# Patient Record
Sex: FEMALE | Race: BLACK OR AFRICAN AMERICAN | NOT HISPANIC OR LATINO | Employment: FULL TIME | URBAN - NONMETROPOLITAN AREA
[De-identification: names, ages, dates, MRNs, and addresses within clinical notes are randomized per-mention and may not be internally consistent; named-entity substitution may affect disease eponyms.]

---

## 2017-01-01 ENCOUNTER — HOSPITAL ENCOUNTER (OUTPATIENT)
Dept: GENERAL RADIOLOGY | Facility: HOSPITAL | Age: 47
Discharge: HOME | End: 2017-12-04
Admitting: NURSE PRACTITIONER

## 2017-01-01 ENCOUNTER — TRANSCRIBE ORDERS (OUTPATIENT)
Dept: GENERAL RADIOLOGY | Facility: HOSPITAL | Age: 47
End: 2017-01-01

## 2017-01-01 DIAGNOSIS — R05.9 COUGH: Primary | ICD-10-CM

## 2017-01-01 DIAGNOSIS — R06.00 DYSPNEA, UNSPECIFIED TYPE: ICD-10-CM

## 2017-01-01 PROCEDURE — 71020 HC CHEST PA AND LATERAL: CPT

## 2018-01-01 ENCOUNTER — APPOINTMENT (OUTPATIENT)
Dept: GENERAL RADIOLOGY | Facility: HOSPITAL | Age: 48
End: 2018-01-01

## 2018-01-01 ENCOUNTER — ANESTHESIA (OUTPATIENT)
Dept: CARDIOVASCULAR ICU | Facility: HOSPITAL | Age: 48
End: 2018-01-01

## 2018-01-01 ENCOUNTER — APPOINTMENT (OUTPATIENT)
Dept: CARDIOLOGY | Facility: HOSPITAL | Age: 48
End: 2018-01-01
Attending: FAMILY MEDICINE

## 2018-01-01 ENCOUNTER — APPOINTMENT (OUTPATIENT)
Dept: CT IMAGING | Facility: HOSPITAL | Age: 48
End: 2018-01-01

## 2018-01-01 ENCOUNTER — ANESTHESIA EVENT (OUTPATIENT)
Dept: CARDIOVASCULAR ICU | Facility: HOSPITAL | Age: 48
End: 2018-01-01

## 2018-01-01 ENCOUNTER — HOSPITAL ENCOUNTER (INPATIENT)
Facility: HOSPITAL | Age: 48
LOS: 2 days | End: 2018-03-11
Attending: EMERGENCY MEDICINE | Admitting: FAMILY MEDICINE

## 2018-01-01 DIAGNOSIS — R50.9 FEVER IN ADULT: ICD-10-CM

## 2018-01-01 DIAGNOSIS — I50.23 ACUTE ON CHRONIC SYSTOLIC CONGESTIVE HEART FAILURE (HCC): ICD-10-CM

## 2018-01-01 DIAGNOSIS — I46.9 CARDIAC ARREST (HCC): Primary | ICD-10-CM

## 2018-01-01 LAB
ABO GROUP BLD: NORMAL
ALBUMIN SERPL-MCNC: 2.9 G/DL (ref 3.5–5)
ALBUMIN SERPL-MCNC: 3.2 G/DL (ref 3.5–5)
ALBUMIN SERPL-MCNC: 3.2 G/DL (ref 3.5–5)
ALBUMIN SERPL-MCNC: 3.4 G/DL (ref 3.5–5)
ALBUMIN SERPL-MCNC: 3.4 G/DL (ref 3.5–5)
ALBUMIN SERPL-MCNC: 3.5 G/DL (ref 3.5–5)
ALBUMIN SERPL-MCNC: 4 G/DL (ref 3.5–5)
ALBUMIN/GLOB SERPL: 1 G/DL (ref 1.1–2.5)
ALBUMIN/GLOB SERPL: 1 G/DL (ref 1.1–2.5)
ALBUMIN/GLOB SERPL: 1.1 G/DL (ref 1.1–2.5)
ALP SERPL-CCNC: 56 U/L (ref 24–120)
ALP SERPL-CCNC: 61 U/L (ref 24–120)
ALP SERPL-CCNC: 63 U/L (ref 24–120)
ALP SERPL-CCNC: 66 U/L (ref 24–120)
ALP SERPL-CCNC: 70 U/L (ref 24–120)
ALP SERPL-CCNC: 72 U/L (ref 24–120)
ALP SERPL-CCNC: 72 U/L (ref 24–120)
ALP SERPL-CCNC: 73 U/L (ref 24–120)
ALP SERPL-CCNC: 84 U/L (ref 24–120)
ALT SERPL W P-5'-P-CCNC: 164 U/L (ref 0–54)
ALT SERPL W P-5'-P-CCNC: 337 U/L (ref 0–54)
ALT SERPL W P-5'-P-CCNC: 454 U/L (ref 0–54)
ALT SERPL W P-5'-P-CCNC: 50 U/L (ref 0–54)
ALT SERPL W P-5'-P-CCNC: 500 U/L (ref 0–54)
ALT SERPL W P-5'-P-CCNC: 52 U/L (ref 0–54)
ALT SERPL W P-5'-P-CCNC: 74 U/L (ref 0–54)
ALT SERPL W P-5'-P-CCNC: 86 U/L (ref 0–54)
ALT SERPL W P-5'-P-CCNC: 88 U/L (ref 0–54)
AMPHET+METHAMPHET UR QL: NEGATIVE
ANION GAP SERPL CALCULATED.3IONS-SCNC: 16 MMOL/L (ref 4–13)
ANION GAP SERPL CALCULATED.3IONS-SCNC: 23 MMOL/L (ref 4–13)
ANION GAP SERPL CALCULATED.3IONS-SCNC: 23 MMOL/L (ref 4–13)
ANION GAP SERPL CALCULATED.3IONS-SCNC: 26 MMOL/L (ref 4–13)
ANION GAP SERPL CALCULATED.3IONS-SCNC: 27 MMOL/L (ref 4–13)
APTT PPP: 25.4 SECONDS (ref 24.1–34.8)
APTT PPP: 26.3 SECONDS (ref 24.1–34.8)
APTT PPP: 29.2 SECONDS (ref 24.1–34.8)
APTT PPP: 30.3 SECONDS (ref 24.1–34.8)
APTT PPP: 30.8 SECONDS (ref 24.1–34.8)
APTT PPP: 33.1 SECONDS (ref 24.1–34.8)
APTT PPP: 33.5 SECONDS (ref 24.1–34.8)
APTT PPP: 34.1 SECONDS (ref 24.1–34.8)
APTT PPP: 34.3 SECONDS (ref 24.1–34.8)
ARTERIAL PATENCY WRIST A: ABNORMAL
ARTERIAL PATENCY WRIST A: POSITIVE
ARTERIAL PATENCY WRIST A: POSITIVE
AST SERPL-CCNC: 128 U/L (ref 7–45)
AST SERPL-CCNC: 166 U/L (ref 7–45)
AST SERPL-CCNC: 271 U/L (ref 7–45)
AST SERPL-CCNC: 51 U/L (ref 7–45)
AST SERPL-CCNC: 53 U/L (ref 7–45)
AST SERPL-CCNC: 533 U/L (ref 7–45)
AST SERPL-CCNC: 722 U/L (ref 7–45)
AST SERPL-CCNC: 733 U/L (ref 7–45)
AST SERPL-CCNC: 96 U/L (ref 7–45)
ATMOSPHERIC PRESS: 747 MMHG
ATMOSPHERIC PRESS: 748 MMHG
ATMOSPHERIC PRESS: 749 MMHG
B-HCG UR QL: NEGATIVE
BACTERIA SPEC AEROBE CULT: NORMAL
BACTERIA UR QL AUTO: ABNORMAL /HPF
BARBITURATES UR QL SCN: NEGATIVE
BASE EXCESS BLDA CALC-SCNC: -11.6 MMOL/L (ref 0–2)
BASE EXCESS BLDA CALC-SCNC: -11.9 MMOL/L (ref 0–2)
BASE EXCESS BLDA CALC-SCNC: -14.1 MMOL/L (ref 0–2)
BASE EXCESS BLDA CALC-SCNC: -15.2 MMOL/L (ref 0–2)
BASE EXCESS BLDA CALC-SCNC: -15.7 MMOL/L (ref 0–2)
BASE EXCESS BLDA CALC-SCNC: -17.2 MMOL/L (ref 0–2)
BASE EXCESS BLDA CALC-SCNC: -2.5 MMOL/L (ref 0–2)
BASE EXCESS BLDA CALC-SCNC: -3.7 MMOL/L (ref 0–2)
BASE EXCESS BLDA CALC-SCNC: -4.9 MMOL/L (ref 0–2)
BASE EXCESS BLDA CALC-SCNC: -8.7 MMOL/L (ref 0–2)
BASOPHILS # BLD AUTO: 0.03 10*3/MM3 (ref 0–0.2)
BASOPHILS # BLD AUTO: 0.03 10*3/MM3 (ref 0–0.2)
BASOPHILS # BLD AUTO: 0.04 10*3/MM3 (ref 0–0.2)
BASOPHILS # BLD AUTO: 0.04 10*3/MM3 (ref 0–0.2)
BASOPHILS # BLD AUTO: 0.05 10*3/MM3 (ref 0–0.2)
BASOPHILS # BLD AUTO: 0.05 10*3/MM3 (ref 0–0.2)
BASOPHILS # BLD AUTO: 0.06 10*3/MM3 (ref 0–0.2)
BASOPHILS # BLD AUTO: 0.06 10*3/MM3 (ref 0–0.2)
BASOPHILS # BLD AUTO: 0.07 10*3/MM3 (ref 0–0.2)
BASOPHILS # BLD AUTO: 0.07 10*3/MM3 (ref 0–0.2)
BASOPHILS NFR BLD AUTO: 0.1 % (ref 0–2)
BASOPHILS NFR BLD AUTO: 0.2 % (ref 0–2)
BASOPHILS NFR BLD AUTO: 0.2 % (ref 0–2)
BASOPHILS NFR BLD AUTO: 0.3 % (ref 0–2)
BASOPHILS NFR BLD AUTO: 0.4 % (ref 0–2)
BASOPHILS NFR BLD AUTO: 0.4 % (ref 0–2)
BDY SITE: ABNORMAL
BENZODIAZ UR QL SCN: NEGATIVE
BH CV ECHO MEAS - AO MAX PG (FULL): 1.4 MMHG
BH CV ECHO MEAS - AO MAX PG: 1.7 MMHG
BH CV ECHO MEAS - AO MEAN PG (FULL): 1 MMHG
BH CV ECHO MEAS - AO MEAN PG: 1 MMHG
BH CV ECHO MEAS - AO ROOT AREA (BSA CORRECTED): 1.3
BH CV ECHO MEAS - AO ROOT AREA: 7.5 CM^2
BH CV ECHO MEAS - AO ROOT DIAM: 3.1 CM
BH CV ECHO MEAS - AO V2 MAX: 64.7 CM/SEC
BH CV ECHO MEAS - AO V2 MEAN: 49.4 CM/SEC
BH CV ECHO MEAS - AO V2 VTI: 9.2 CM
BH CV ECHO MEAS - AVA(I,A): 1.2 CM^2
BH CV ECHO MEAS - AVA(I,D): 1.2 CM^2
BH CV ECHO MEAS - AVA(V,A): 1.3 CM^2
BH CV ECHO MEAS - AVA(V,D): 1.3 CM^2
BH CV ECHO MEAS - BSA(HAYCOCK): 2.8 M^2
BH CV ECHO MEAS - BSA: 2.5 M^2
BH CV ECHO MEAS - BZI_BMI: 59.4 KILOGRAMS/M^2
BH CV ECHO MEAS - BZI_METRIC_HEIGHT: 162.6 CM
BH CV ECHO MEAS - BZI_METRIC_WEIGHT: 156.9 KG
BH CV ECHO MEAS - CONTRAST EF 4CH: 25.7 ML/M^2
BH CV ECHO MEAS - EDV(CUBED): 205.4 ML
BH CV ECHO MEAS - EDV(MOD-SP4): 167 ML
BH CV ECHO MEAS - EDV(TEICH): 173.2 ML
BH CV ECHO MEAS - EF(CUBED): 19 %
BH CV ECHO MEAS - EF(MOD-SP4): 25.7 %
BH CV ECHO MEAS - EF(TEICH): 14.9 %
BH CV ECHO MEAS - ESV(CUBED): 166.4 ML
BH CV ECHO MEAS - ESV(MOD-SP4): 124 ML
BH CV ECHO MEAS - ESV(TEICH): 147.4 ML
BH CV ECHO MEAS - FS: 6.8 %
BH CV ECHO MEAS - IVS/LVPW: 0.81
BH CV ECHO MEAS - IVSD: 1.3 CM
BH CV ECHO MEAS - LA DIMENSION: 4.2 CM
BH CV ECHO MEAS - LA/AO: 1.4
BH CV ECHO MEAS - LV DIASTOLIC VOL/BSA (35-75): 67.6 ML/M^2
BH CV ECHO MEAS - LV MASS(C)D: 396.7 GRAMS
BH CV ECHO MEAS - LV MASS(C)DI: 160.7 GRAMS/M^2
BH CV ECHO MEAS - LV MAX PG: 0.29 MMHG
BH CV ECHO MEAS - LV MEAN PG: 0 MMHG
BH CV ECHO MEAS - LV SYSTOLIC VOL/BSA (12-30): 50.2 ML/M^2
BH CV ECHO MEAS - LV V1 MAX: 26.7 CM/SEC
BH CV ECHO MEAS - LV V1 MEAN: 18.7 CM/SEC
BH CV ECHO MEAS - LV V1 VTI: 3.4 CM
BH CV ECHO MEAS - LVIDD: 5.9 CM
BH CV ECHO MEAS - LVIDS: 5.5 CM
BH CV ECHO MEAS - LVLD AP4: 8.8 CM
BH CV ECHO MEAS - LVLS AP4: 7.9 CM
BH CV ECHO MEAS - LVOT AREA (M): 3.1 CM^2
BH CV ECHO MEAS - LVOT AREA: 3.1 CM^2
BH CV ECHO MEAS - LVOT DIAM: 2 CM
BH CV ECHO MEAS - LVPWD: 1.6 CM
BH CV ECHO MEAS - MV DEC TIME: 0.28 SEC
BH CV ECHO MEAS - MV E MAX VEL: 42.4 CM/SEC
BH CV ECHO MEAS - RVSP: 22 MMHG
BH CV ECHO MEAS - SI(AO): 28.1 ML/M^2
BH CV ECHO MEAS - SI(CUBED): 15.8 ML/M^2
BH CV ECHO MEAS - SI(LVOT): 4.4 ML/M^2
BH CV ECHO MEAS - SI(MOD-SP4): 17.4 ML/M^2
BH CV ECHO MEAS - SI(TEICH): 10.4 ML/M^2
BH CV ECHO MEAS - SV(AO): 69.4 ML
BH CV ECHO MEAS - SV(CUBED): 39 ML
BH CV ECHO MEAS - SV(LVOT): 10.7 ML
BH CV ECHO MEAS - SV(MOD-SP4): 43 ML
BH CV ECHO MEAS - SV(TEICH): 25.8 ML
BH CV ECHO MEAS - TR MAX VEL: 186 CM/SEC
BILIRUB SERPL-MCNC: 0.8 MG/DL (ref 0.1–1)
BILIRUB SERPL-MCNC: 0.8 MG/DL (ref 0.1–1)
BILIRUB SERPL-MCNC: 1.1 MG/DL (ref 0.1–1)
BILIRUB SERPL-MCNC: 1.2 MG/DL (ref 0.1–1)
BILIRUB SERPL-MCNC: 1.3 MG/DL (ref 0.1–1)
BILIRUB SERPL-MCNC: 1.3 MG/DL (ref 0.1–1)
BILIRUB SERPL-MCNC: 1.5 MG/DL (ref 0.1–1)
BILIRUB SERPL-MCNC: 1.6 MG/DL (ref 0.1–1)
BILIRUB SERPL-MCNC: 1.6 MG/DL (ref 0.1–1)
BILIRUB UR QL STRIP: NEGATIVE
BLD GP AB SCN SERPL QL: NEGATIVE
BODY TEMPERATURE: 32.4 C
BODY TEMPERATURE: 34.3 C
BODY TEMPERATURE: 36 C
BODY TEMPERATURE: 37 C
BODY TEMPERATURE: 37.5 C
BODY TEMPERATURE: 38.6 C
BUN BLD-MCNC: 18 MG/DL (ref 5–21)
BUN BLD-MCNC: 19 MG/DL (ref 5–21)
BUN BLD-MCNC: 19 MG/DL (ref 5–21)
BUN BLD-MCNC: 20 MG/DL (ref 5–21)
BUN BLD-MCNC: 20 MG/DL (ref 5–21)
BUN BLD-MCNC: 21 MG/DL (ref 5–21)
BUN BLD-MCNC: 22 MG/DL (ref 5–21)
BUN BLD-MCNC: 24 MG/DL (ref 5–21)
BUN BLD-MCNC: 25 MG/DL (ref 5–21)
BUN/CREAT SERPL: 12 (ref 7–25)
BUN/CREAT SERPL: 12.7 (ref 7–25)
BUN/CREAT SERPL: 12.9 (ref 7–25)
BUN/CREAT SERPL: 13.4 (ref 7–25)
BUN/CREAT SERPL: 13.7 (ref 7–25)
BUN/CREAT SERPL: 13.8 (ref 7–25)
BUN/CREAT SERPL: 14.5 (ref 7–25)
BUN/CREAT SERPL: 15.8 (ref 7–25)
BUN/CREAT SERPL: 19.4 (ref 7–25)
CA-I BLD-MCNC: 3.38 MG/DL (ref 4.6–5.4)
CA-I BLD-MCNC: 3.45 MG/DL (ref 4.6–5.4)
CA-I BLD-MCNC: 3.58 MG/DL (ref 4.6–5.4)
CA-I BLD-MCNC: 3.65 MG/DL (ref 4.6–5.4)
CA-I BLD-MCNC: 3.75 MG/DL (ref 4.6–5.4)
CA-I BLD-MCNC: 3.76 MG/DL (ref 4.6–5.4)
CA-I BLD-MCNC: 3.93 MG/DL (ref 4.6–5.4)
CA-I BLD-MCNC: 3.93 MG/DL (ref 4.6–5.4)
CA-I BLD-MCNC: 4.46 MG/DL (ref 4.6–5.4)
CALCIUM SPEC-SCNC: 6.9 MG/DL (ref 8.4–10.4)
CALCIUM SPEC-SCNC: 7.1 MG/DL (ref 8.4–10.4)
CALCIUM SPEC-SCNC: 7.2 MG/DL (ref 8.4–10.4)
CALCIUM SPEC-SCNC: 7.4 MG/DL (ref 8.4–10.4)
CALCIUM SPEC-SCNC: 8 MG/DL (ref 8.4–10.4)
CALCIUM SPEC-SCNC: 8.5 MG/DL (ref 8.4–10.4)
CANNABINOIDS SERPL QL: NEGATIVE
CHLORIDE SERPL-SCNC: 100 MMOL/L (ref 98–110)
CHLORIDE SERPL-SCNC: 101 MMOL/L (ref 98–110)
CHLORIDE SERPL-SCNC: 101 MMOL/L (ref 98–110)
CHLORIDE SERPL-SCNC: 105 MMOL/L (ref 98–110)
CHLORIDE SERPL-SCNC: 106 MMOL/L (ref 98–110)
CK MB SERPL-CCNC: 4.23 NG/ML (ref 0–5)
CK MB SERPL-CCNC: 8.15 NG/ML (ref 0–5)
CK MB SERPL-RTO: 4.2 % (ref 0–5.7)
CK SERPL-CCNC: 176 U/L (ref 0–203)
CK SERPL-CCNC: 176 U/L (ref 0–203)
CK SERPL-CCNC: 193 U/L (ref 0–203)
CLARITY UR: ABNORMAL
CO2 SERPL-SCNC: 11 MMOL/L (ref 24–31)
CO2 SERPL-SCNC: 14 MMOL/L (ref 24–31)
CO2 SERPL-SCNC: 15 MMOL/L (ref 24–31)
CO2 SERPL-SCNC: 15 MMOL/L (ref 24–31)
CO2 SERPL-SCNC: 18 MMOL/L (ref 24–31)
CO2 SERPL-SCNC: 20 MMOL/L (ref 24–31)
CO2 SERPL-SCNC: 24 MMOL/L (ref 24–31)
CO2 SERPL-SCNC: 25 MMOL/L (ref 24–31)
CO2 SERPL-SCNC: 25 MMOL/L (ref 24–31)
COCAINE UR QL: NEGATIVE
COHGB MFR BLD: 0.8 % (ref 0–5)
COLOR UR: YELLOW
CREAT BLD-MCNC: 0.98 MG/DL (ref 0.5–1.4)
CREAT BLD-MCNC: 1.14 MG/DL (ref 0.5–1.4)
CREAT BLD-MCNC: 1.38 MG/DL (ref 0.5–1.4)
CREAT BLD-MCNC: 1.38 MG/DL (ref 0.5–1.4)
CREAT BLD-MCNC: 1.57 MG/DL (ref 0.5–1.4)
CREAT BLD-MCNC: 1.58 MG/DL (ref 0.5–1.4)
CREAT BLD-MCNC: 1.82 MG/DL (ref 0.5–1.4)
CREAT BLD-MCNC: 1.83 MG/DL (ref 0.5–1.4)
CREAT BLD-MCNC: 1.86 MG/DL (ref 0.5–1.4)
D-LACTATE SERPL-SCNC: 1.8 MMOL/L (ref 0.5–2)
D-LACTATE SERPL-SCNC: 10.7 MMOL/L (ref 0.5–2)
D-LACTATE SERPL-SCNC: 10.8 MMOL/L (ref 0.5–2)
D-LACTATE SERPL-SCNC: 10.9 MMOL/L (ref 0.5–2)
D-LACTATE SERPL-SCNC: 11.1 MMOL/L (ref 0.5–2)
D-LACTATE SERPL-SCNC: 11.7 MMOL/L (ref 0.5–2)
D-LACTATE SERPL-SCNC: 3.4 MMOL/L (ref 0.5–2)
D-LACTATE SERPL-SCNC: 5.2 MMOL/L (ref 0.5–2)
D-LACTATE SERPL-SCNC: 6 MMOL/L (ref 0.5–2)
D-LACTATE SERPL-SCNC: 7.4 MMOL/L (ref 0.5–2)
D-LACTATE SERPL-SCNC: 7.8 MMOL/L (ref 0.5–2)
DEPRECATED RDW RBC AUTO: 45.6 FL (ref 40–54)
DEPRECATED RDW RBC AUTO: 46.1 FL (ref 40–54)
DEPRECATED RDW RBC AUTO: 46.5 FL (ref 40–54)
DEPRECATED RDW RBC AUTO: 48.4 FL (ref 40–54)
DEPRECATED RDW RBC AUTO: 48.4 FL (ref 40–54)
DEPRECATED RDW RBC AUTO: 48.7 FL (ref 40–54)
DEPRECATED RDW RBC AUTO: 49.5 FL (ref 40–54)
DEPRECATED RDW RBC AUTO: 49.8 FL (ref 40–54)
DEPRECATED RDW RBC AUTO: 49.9 FL (ref 40–54)
DEPRECATED RDW RBC AUTO: 50.4 FL (ref 40–54)
E/E' RATIO: 10.5
EOSINOPHIL # BLD AUTO: 0 10*3/MM3 (ref 0–0.7)
EOSINOPHIL # BLD AUTO: 0.01 10*3/MM3 (ref 0–0.7)
EOSINOPHIL # BLD AUTO: 0.02 10*3/MM3 (ref 0–0.7)
EOSINOPHIL # BLD AUTO: 0.12 10*3/MM3 (ref 0–0.7)
EOSINOPHIL NFR BLD AUTO: 0 % (ref 0–4)
EOSINOPHIL NFR BLD AUTO: 0.1 % (ref 0–4)
EOSINOPHIL NFR BLD AUTO: 0.6 % (ref 0–4)
ERYTHROCYTE [DISTWIDTH] IN BLOOD BY AUTOMATED COUNT: 14.4 % (ref 12–15)
ERYTHROCYTE [DISTWIDTH] IN BLOOD BY AUTOMATED COUNT: 14.5 % (ref 12–15)
ERYTHROCYTE [DISTWIDTH] IN BLOOD BY AUTOMATED COUNT: 14.7 % (ref 12–15)
ERYTHROCYTE [DISTWIDTH] IN BLOOD BY AUTOMATED COUNT: 14.9 % (ref 12–15)
ERYTHROCYTE [DISTWIDTH] IN BLOOD BY AUTOMATED COUNT: 15 % (ref 12–15)
ERYTHROCYTE [DISTWIDTH] IN BLOOD BY AUTOMATED COUNT: 15 % (ref 12–15)
ERYTHROCYTE [DISTWIDTH] IN BLOOD BY AUTOMATED COUNT: 15.2 % (ref 12–15)
ERYTHROCYTE [DISTWIDTH] IN BLOOD BY AUTOMATED COUNT: 15.2 % (ref 12–15)
ETHANOL UR QL: <0.01 %
FLUAV AG NPH QL: NEGATIVE
FLUBV AG NPH QL IA: NEGATIVE
GFR SERPL CREATININE-BSD FRML MDRD: 35 ML/MIN/1.73
GFR SERPL CREATININE-BSD FRML MDRD: 36 ML/MIN/1.73
GFR SERPL CREATININE-BSD FRML MDRD: 36 ML/MIN/1.73
GFR SERPL CREATININE-BSD FRML MDRD: 43 ML/MIN/1.73
GFR SERPL CREATININE-BSD FRML MDRD: 43 ML/MIN/1.73
GFR SERPL CREATININE-BSD FRML MDRD: 50 ML/MIN/1.73
GFR SERPL CREATININE-BSD FRML MDRD: 50 ML/MIN/1.73
GFR SERPL CREATININE-BSD FRML MDRD: 62 ML/MIN/1.73
GFR SERPL CREATININE-BSD FRML MDRD: 74 ML/MIN/1.73
GLOBULIN UR ELPH-MCNC: 2.8 GM/DL
GLOBULIN UR ELPH-MCNC: 3 GM/DL
GLOBULIN UR ELPH-MCNC: 3 GM/DL
GLOBULIN UR ELPH-MCNC: 3.1 GM/DL
GLOBULIN UR ELPH-MCNC: 3.2 GM/DL
GLOBULIN UR ELPH-MCNC: 3.2 GM/DL
GLOBULIN UR ELPH-MCNC: 3.3 GM/DL
GLOBULIN UR ELPH-MCNC: 3.3 GM/DL
GLOBULIN UR ELPH-MCNC: 3.6 GM/DL
GLUCOSE BLD-MCNC: 134 MG/DL (ref 70–100)
GLUCOSE BLD-MCNC: 153 MG/DL (ref 70–100)
GLUCOSE BLD-MCNC: 159 MG/DL (ref 70–100)
GLUCOSE BLD-MCNC: 159 MG/DL (ref 70–100)
GLUCOSE BLD-MCNC: 171 MG/DL (ref 70–100)
GLUCOSE BLD-MCNC: 174 MG/DL (ref 70–100)
GLUCOSE BLD-MCNC: 180 MG/DL (ref 70–100)
GLUCOSE BLD-MCNC: 188 MG/DL (ref 70–100)
GLUCOSE BLD-MCNC: 95 MG/DL (ref 70–100)
GLUCOSE BLDC GLUCOMTR-MCNC: 101 MG/DL (ref 70–130)
GLUCOSE BLDC GLUCOMTR-MCNC: 118 MG/DL (ref 70–130)
GLUCOSE BLDC GLUCOMTR-MCNC: 120 MG/DL (ref 70–130)
GLUCOSE BLDC GLUCOMTR-MCNC: 122 MG/DL (ref 70–130)
GLUCOSE BLDC GLUCOMTR-MCNC: 133 MG/DL (ref 70–130)
GLUCOSE BLDC GLUCOMTR-MCNC: 143 MG/DL (ref 70–130)
GLUCOSE BLDC GLUCOMTR-MCNC: 144 MG/DL (ref 70–130)
GLUCOSE BLDC GLUCOMTR-MCNC: 145 MG/DL (ref 70–130)
GLUCOSE BLDC GLUCOMTR-MCNC: 146 MG/DL (ref 70–130)
GLUCOSE BLDC GLUCOMTR-MCNC: 157 MG/DL (ref 70–130)
GLUCOSE BLDC GLUCOMTR-MCNC: 157 MG/DL (ref 70–130)
GLUCOSE BLDC GLUCOMTR-MCNC: 159 MG/DL (ref 70–130)
GLUCOSE BLDC GLUCOMTR-MCNC: 191 MG/DL (ref 70–130)
GLUCOSE BLDC GLUCOMTR-MCNC: 249 MG/DL (ref 70–130)
GLUCOSE BLDC GLUCOMTR-MCNC: 59 MG/DL (ref 70–130)
GLUCOSE BLDC GLUCOMTR-MCNC: 78 MG/DL (ref 70–130)
GLUCOSE BLDC GLUCOMTR-MCNC: 89 MG/DL (ref 70–130)
GLUCOSE BLDC GLUCOMTR-MCNC: 94 MG/DL (ref 70–130)
GLUCOSE BLDC GLUCOMTR-MCNC: 98 MG/DL (ref 70–130)
GLUCOSE BLDC GLUCOMTR-MCNC: 99 MG/DL (ref 70–130)
GLUCOSE UR STRIP-MCNC: ABNORMAL MG/DL
HCG SERPL QL: NEGATIVE
HCG SERPL QL: NEGATIVE
HCO3 BLDA-SCNC: 11.2 MMOL/L (ref 20–26)
HCO3 BLDA-SCNC: 12.4 MMOL/L (ref 20–26)
HCO3 BLDA-SCNC: 13.1 MMOL/L (ref 20–26)
HCO3 BLDA-SCNC: 14.4 MMOL/L (ref 20–26)
HCO3 BLDA-SCNC: 17.1 MMOL/L (ref 20–26)
HCO3 BLDA-SCNC: 18.3 MMOL/L (ref 20–26)
HCO3 BLDA-SCNC: 18.9 MMOL/L (ref 20–26)
HCO3 BLDA-SCNC: 22.3 MMOL/L (ref 20–26)
HCO3 BLDA-SCNC: 23.1 MMOL/L (ref 20–26)
HCO3 BLDA-SCNC: 23.4 MMOL/L (ref 20–26)
HCT VFR BLD AUTO: 39.2 % (ref 37–47)
HCT VFR BLD AUTO: 42.4 % (ref 37–47)
HCT VFR BLD AUTO: 42.5 % (ref 37–47)
HCT VFR BLD AUTO: 42.5 % (ref 37–47)
HCT VFR BLD AUTO: 45.2 % (ref 37–47)
HCT VFR BLD AUTO: 46.3 % (ref 37–47)
HCT VFR BLD AUTO: 46.4 % (ref 37–47)
HCT VFR BLD AUTO: 47.4 % (ref 37–47)
HCT VFR BLD AUTO: 48.5 % (ref 37–47)
HCT VFR BLD AUTO: 49.2 % (ref 37–47)
HCT VFR BLD CALC: 40.5 % (ref 38–51)
HGB BLD-MCNC: 12.4 G/DL (ref 12–16)
HGB BLD-MCNC: 13 G/DL (ref 12–16)
HGB BLD-MCNC: 13.1 G/DL (ref 12–16)
HGB BLD-MCNC: 13.2 G/DL (ref 12–16)
HGB BLD-MCNC: 13.4 G/DL (ref 12–16)
HGB BLD-MCNC: 14.1 G/DL (ref 12–16)
HGB BLD-MCNC: 14.2 G/DL (ref 12–16)
HGB BLD-MCNC: 14.4 G/DL (ref 12–16)
HGB BLD-MCNC: 14.6 G/DL (ref 12–16)
HGB BLD-MCNC: 14.7 G/DL (ref 12–16)
HGB BLDA-MCNC: 13.2 G/DL (ref 13.5–17.5)
HGB UR QL STRIP.AUTO: ABNORMAL
HOLD SPECIMEN: NORMAL
HOROWITZ INDEX BLD+IHG-RTO: 100 %
HOROWITZ INDEX BLD+IHG-RTO: 40 %
HOROWITZ INDEX BLD+IHG-RTO: 50 %
HOROWITZ INDEX BLD+IHG-RTO: 50 %
HOROWITZ INDEX BLD+IHG-RTO: 60 %
HOROWITZ INDEX BLD+IHG-RTO: 80 %
HYALINE CASTS UR QL AUTO: ABNORMAL /LPF
IMM GRANULOCYTES # BLD: 0.07 10*3/MM3 (ref 0–0.03)
IMM GRANULOCYTES # BLD: 0.09 10*3/MM3 (ref 0–0.03)
IMM GRANULOCYTES # BLD: 0.13 10*3/MM3 (ref 0–0.03)
IMM GRANULOCYTES # BLD: 0.16 10*3/MM3 (ref 0–0.03)
IMM GRANULOCYTES # BLD: 0.22 10*3/MM3 (ref 0–0.03)
IMM GRANULOCYTES # BLD: 0.25 10*3/MM3 (ref 0–0.03)
IMM GRANULOCYTES # BLD: 0.26 10*3/MM3 (ref 0–0.03)
IMM GRANULOCYTES # BLD: 0.33 10*3/MM3 (ref 0–0.03)
IMM GRANULOCYTES # BLD: 0.33 10*3/MM3 (ref 0–0.03)
IMM GRANULOCYTES # BLD: 0.35 10*3/MM3 (ref 0–0.03)
IMM GRANULOCYTES NFR BLD: 0.5 % (ref 0–5)
IMM GRANULOCYTES NFR BLD: 0.6 % (ref 0–5)
IMM GRANULOCYTES NFR BLD: 0.6 % (ref 0–5)
IMM GRANULOCYTES NFR BLD: 0.9 % (ref 0–5)
IMM GRANULOCYTES NFR BLD: 1.2 % (ref 0–5)
IMM GRANULOCYTES NFR BLD: 1.2 % (ref 0–5)
IMM GRANULOCYTES NFR BLD: 1.3 % (ref 0–5)
IMM GRANULOCYTES NFR BLD: 1.7 % (ref 0–5)
IMM GRANULOCYTES NFR BLD: 1.9 % (ref 0–5)
IMM GRANULOCYTES NFR BLD: 2.1 % (ref 0–5)
INR PPP: 1.09 (ref 0.91–1.09)
INR PPP: 1.1 (ref 0.91–1.09)
KETONES UR QL STRIP: NEGATIVE
LEFT ATRIUM VOLUME INDEX: 32.3 ML/M2
LEFT ATRIUM VOLUME: 79.9 CM3
LEUKOCYTE ESTERASE UR QL STRIP.AUTO: NEGATIVE
LYMPHOCYTES # BLD AUTO: 1.15 10*3/MM3 (ref 0.72–4.86)
LYMPHOCYTES # BLD AUTO: 1.96 10*3/MM3 (ref 0.72–4.86)
LYMPHOCYTES # BLD AUTO: 2.06 10*3/MM3 (ref 0.72–4.86)
LYMPHOCYTES # BLD AUTO: 2.14 10*3/MM3 (ref 0.72–4.86)
LYMPHOCYTES # BLD AUTO: 2.18 10*3/MM3 (ref 0.72–4.86)
LYMPHOCYTES # BLD AUTO: 2.25 10*3/MM3 (ref 0.72–4.86)
LYMPHOCYTES # BLD AUTO: 2.28 10*3/MM3 (ref 0.72–4.86)
LYMPHOCYTES # BLD AUTO: 2.79 10*3/MM3 (ref 0.72–4.86)
LYMPHOCYTES # BLD AUTO: 2.8 10*3/MM3 (ref 0.72–4.86)
LYMPHOCYTES # BLD AUTO: 2.8 10*3/MM3 (ref 0.72–4.86)
LYMPHOCYTES NFR BLD AUTO: 10.2 % (ref 15–45)
LYMPHOCYTES NFR BLD AUTO: 10.6 % (ref 15–45)
LYMPHOCYTES NFR BLD AUTO: 11.5 % (ref 15–45)
LYMPHOCYTES NFR BLD AUTO: 12.7 % (ref 15–45)
LYMPHOCYTES NFR BLD AUTO: 13.7 % (ref 15–45)
LYMPHOCYTES NFR BLD AUTO: 13.7 % (ref 15–45)
LYMPHOCYTES NFR BLD AUTO: 13.8 % (ref 15–45)
LYMPHOCYTES NFR BLD AUTO: 14 % (ref 15–45)
LYMPHOCYTES NFR BLD AUTO: 16.3 % (ref 15–45)
LYMPHOCYTES NFR BLD AUTO: 7.7 % (ref 15–45)
Lab: ABNORMAL
MAGNESIUM SERPL-MCNC: 2.1 MG/DL (ref 1.4–2.2)
MAGNESIUM SERPL-MCNC: 2.1 MG/DL (ref 1.4–2.2)
MAGNESIUM SERPL-MCNC: 2.2 MG/DL (ref 1.4–2.2)
MAGNESIUM SERPL-MCNC: 2.3 MG/DL (ref 1.4–2.2)
MAGNESIUM SERPL-MCNC: 2.7 MG/DL (ref 1.4–2.2)
MAGNESIUM SERPL-MCNC: 2.8 MG/DL (ref 1.4–2.2)
MAGNESIUM SERPL-MCNC: 2.9 MG/DL (ref 1.4–2.2)
MAXIMAL PREDICTED HEART RATE: 174 BPM
MCH RBC QN AUTO: 26.8 PG (ref 28–32)
MCH RBC QN AUTO: 27.1 PG (ref 28–32)
MCH RBC QN AUTO: 27.1 PG (ref 28–32)
MCH RBC QN AUTO: 27.2 PG (ref 28–32)
MCH RBC QN AUTO: 27.3 PG (ref 28–32)
MCH RBC QN AUTO: 27.3 PG (ref 28–32)
MCH RBC QN AUTO: 27.4 PG (ref 28–32)
MCH RBC QN AUTO: 27.4 PG (ref 28–32)
MCH RBC QN AUTO: 27.5 PG (ref 28–32)
MCH RBC QN AUTO: 27.7 PG (ref 28–32)
MCHC RBC AUTO-ENTMCNC: 29.2 G/DL (ref 33–36)
MCHC RBC AUTO-ENTMCNC: 29.7 G/DL (ref 33–36)
MCHC RBC AUTO-ENTMCNC: 30.3 G/DL (ref 33–36)
MCHC RBC AUTO-ENTMCNC: 30.4 G/DL (ref 33–36)
MCHC RBC AUTO-ENTMCNC: 30.4 G/DL (ref 33–36)
MCHC RBC AUTO-ENTMCNC: 30.6 G/DL (ref 33–36)
MCHC RBC AUTO-ENTMCNC: 30.7 G/DL (ref 33–36)
MCHC RBC AUTO-ENTMCNC: 30.8 G/DL (ref 33–36)
MCHC RBC AUTO-ENTMCNC: 31.6 G/DL (ref 33–36)
MCHC RBC AUTO-ENTMCNC: 31.6 G/DL (ref 33–36)
MCV RBC AUTO: 85.7 FL (ref 82–98)
MCV RBC AUTO: 86.3 FL (ref 82–98)
MCV RBC AUTO: 88.5 FL (ref 82–98)
MCV RBC AUTO: 88.5 FL (ref 82–98)
MCV RBC AUTO: 90.1 FL (ref 82–98)
MCV RBC AUTO: 90.4 FL (ref 82–98)
MCV RBC AUTO: 90.5 FL (ref 82–98)
MCV RBC AUTO: 90.6 FL (ref 82–98)
MCV RBC AUTO: 91.4 FL (ref 82–98)
MCV RBC AUTO: 91.7 FL (ref 82–98)
METHADONE UR QL SCN: NEGATIVE
METHGB BLD QL: 1.4 % (ref 0–3)
MODALITY: ABNORMAL
MONOCYTES # BLD AUTO: 0.68 10*3/MM3 (ref 0.19–1.3)
MONOCYTES # BLD AUTO: 0.74 10*3/MM3 (ref 0.19–1.3)
MONOCYTES # BLD AUTO: 0.87 10*3/MM3 (ref 0.19–1.3)
MONOCYTES # BLD AUTO: 0.93 10*3/MM3 (ref 0.19–1.3)
MONOCYTES # BLD AUTO: 0.98 10*3/MM3 (ref 0.19–1.3)
MONOCYTES # BLD AUTO: 1.05 10*3/MM3 (ref 0.19–1.3)
MONOCYTES # BLD AUTO: 1.21 10*3/MM3 (ref 0.19–1.3)
MONOCYTES # BLD AUTO: 1.34 10*3/MM3 (ref 0.19–1.3)
MONOCYTES # BLD AUTO: 1.51 10*3/MM3 (ref 0.19–1.3)
MONOCYTES # BLD AUTO: 1.52 10*3/MM3 (ref 0.19–1.3)
MONOCYTES NFR BLD AUTO: 3.4 % (ref 4–12)
MONOCYTES NFR BLD AUTO: 4.9 % (ref 4–12)
MONOCYTES NFR BLD AUTO: 4.9 % (ref 4–12)
MONOCYTES NFR BLD AUTO: 5.8 % (ref 4–12)
MONOCYTES NFR BLD AUTO: 5.9 % (ref 4–12)
MONOCYTES NFR BLD AUTO: 6.3 % (ref 4–12)
MONOCYTES NFR BLD AUTO: 6.9 % (ref 4–12)
MONOCYTES NFR BLD AUTO: 7.1 % (ref 4–12)
MONOCYTES NFR BLD AUTO: 7.4 % (ref 4–12)
MONOCYTES NFR BLD AUTO: 7.4 % (ref 4–12)
NEUTROPHILS # BLD AUTO: 11.92 10*3/MM3 (ref 1.87–8.4)
NEUTROPHILS # BLD AUTO: 12.26 10*3/MM3 (ref 1.87–8.4)
NEUTROPHILS # BLD AUTO: 12.66 10*3/MM3 (ref 1.87–8.4)
NEUTROPHILS # BLD AUTO: 12.96 10*3/MM3 (ref 1.87–8.4)
NEUTROPHILS # BLD AUTO: 13.84 10*3/MM3 (ref 1.87–8.4)
NEUTROPHILS # BLD AUTO: 14.49 10*3/MM3 (ref 1.87–8.4)
NEUTROPHILS # BLD AUTO: 15.82 10*3/MM3 (ref 1.87–8.4)
NEUTROPHILS # BLD AUTO: 16 10*3/MM3 (ref 1.87–8.4)
NEUTROPHILS # BLD AUTO: 16.94 10*3/MM3 (ref 1.87–8.4)
NEUTROPHILS # BLD AUTO: 17.18 10*3/MM3 (ref 1.87–8.4)
NEUTROPHILS NFR BLD AUTO: 74.2 % (ref 39–78)
NEUTROPHILS NFR BLD AUTO: 77.4 % (ref 39–78)
NEUTROPHILS NFR BLD AUTO: 77.7 % (ref 39–78)
NEUTROPHILS NFR BLD AUTO: 78.2 % (ref 39–78)
NEUTROPHILS NFR BLD AUTO: 78.2 % (ref 39–78)
NEUTROPHILS NFR BLD AUTO: 80.8 % (ref 39–78)
NEUTROPHILS NFR BLD AUTO: 81.2 % (ref 39–78)
NEUTROPHILS NFR BLD AUTO: 81.5 % (ref 39–78)
NEUTROPHILS NFR BLD AUTO: 84.2 % (ref 39–78)
NEUTROPHILS NFR BLD AUTO: 86.4 % (ref 39–78)
NITRITE UR QL STRIP: NEGATIVE
NOTIFIED BY: ABNORMAL
NOTIFIED WHO: ABNORMAL
NRBC BLD MANUAL-RTO: 0 /100 WBC (ref 0–0)
NRBC BLD MANUAL-RTO: 0.1 /100 WBC (ref 0–0)
NRBC BLD MANUAL-RTO: 0.2 /100 WBC (ref 0–0)
NRBC BLD MANUAL-RTO: 0.3 /100 WBC (ref 0–0)
NT-PROBNP SERPL-MCNC: 4230 PG/ML (ref 0–450)
OPIATES UR QL: NEGATIVE
OTHER OBSERVATIONS IN URINE MICRO: ABNORMAL /HPF
OVAL FAT BODIES #/AREA URNS AUTO: ABNORMAL /HPF
OXYHGB MFR BLDV: 80.3 % (ref 94–99)
PCO2 BLDA: 32.1 MM HG (ref 35–45)
PCO2 BLDA: 34.8 MM HG (ref 35–45)
PCO2 BLDA: 36.3 MM HG (ref 35–45)
PCO2 BLDA: 38.2 MM HG (ref 35–45)
PCO2 BLDA: 42.4 MM HG (ref 35–45)
PCO2 BLDA: 43.7 MM HG (ref 35–45)
PCO2 BLDA: 47.8 MM HG (ref 35–45)
PCO2 BLDA: 48.4 MM HG (ref 35–45)
PCO2 BLDA: 63.2 MM HG (ref 35–45)
PCO2 BLDA: 63.5 MM HG (ref 35–45)
PCP UR QL SCN: NEGATIVE
PEEP RESPIRATORY: 5 CM[H2O]
PEEP RESPIRATORY: 8 CM[H2O]
PH BLDA: 7.04 PH UNITS (ref 7.35–7.45)
PH BLDA: 7.08 PH UNITS (ref 7.35–7.45)
PH BLDA: 7.12 PH UNITS (ref 7.35–7.45)
PH BLDA: 7.14 PH UNITS (ref 7.35–7.45)
PH BLDA: 7.14 PH UNITS (ref 7.35–7.45)
PH BLDA: 7.24 PH UNITS (ref 7.35–7.45)
PH BLDA: 7.26 PH UNITS (ref 7.35–7.45)
PH BLDA: 7.27 PH UNITS (ref 7.35–7.45)
PH BLDA: 7.29 PH UNITS (ref 7.35–7.45)
PH BLDA: 7.34 PH UNITS (ref 7.35–7.45)
PH UR STRIP.AUTO: 7 [PH] (ref 5–8)
PHOSPHATE SERPL-MCNC: 4.7 MG/DL (ref 2.5–4.5)
PHOSPHATE SERPL-MCNC: 5.3 MG/DL (ref 2.5–4.5)
PHOSPHATE SERPL-MCNC: 5.4 MG/DL (ref 2.5–4.5)
PHOSPHATE SERPL-MCNC: 7.2 MG/DL (ref 2.5–4.5)
PHOSPHATE SERPL-MCNC: 7.2 MG/DL (ref 2.5–4.5)
PHOSPHATE SERPL-MCNC: 7.7 MG/DL (ref 2.5–4.5)
PHOSPHATE SERPL-MCNC: 8 MG/DL (ref 2.5–4.5)
PHOSPHATE SERPL-MCNC: 8.6 MG/DL (ref 2.5–4.5)
PHOSPHATE SERPL-MCNC: 8.6 MG/DL (ref 2.5–4.5)
PLATELET # BLD AUTO: 196 10*3/MM3 (ref 130–400)
PLATELET # BLD AUTO: 210 10*3/MM3 (ref 130–400)
PLATELET # BLD AUTO: 220 10*3/MM3 (ref 130–400)
PLATELET # BLD AUTO: 232 10*3/MM3 (ref 130–400)
PLATELET # BLD AUTO: 249 10*3/MM3 (ref 130–400)
PLATELET # BLD AUTO: 250 10*3/MM3 (ref 130–400)
PLATELET # BLD AUTO: 257 10*3/MM3 (ref 130–400)
PLATELET # BLD AUTO: 265 10*3/MM3 (ref 130–400)
PLATELET # BLD AUTO: 286 10*3/MM3 (ref 130–400)
PLATELET # BLD AUTO: 313 10*3/MM3 (ref 130–400)
PMV BLD AUTO: 10 FL (ref 6–12)
PMV BLD AUTO: 10.1 FL (ref 6–12)
PMV BLD AUTO: 9.6 FL (ref 6–12)
PMV BLD AUTO: 9.7 FL (ref 6–12)
PMV BLD AUTO: 9.8 FL (ref 6–12)
PMV BLD AUTO: 9.9 FL (ref 6–12)
PO2 BLDA: 118 MM HG (ref 83–108)
PO2 BLDA: 139 MM HG (ref 83–108)
PO2 BLDA: 147 MM HG (ref 83–108)
PO2 BLDA: 154 MM HG (ref 83–108)
PO2 BLDA: 177 MM HG (ref 83–108)
PO2 BLDA: 201 MM HG (ref 83–108)
PO2 BLDA: 302 MM HG (ref 83–108)
PO2 BLDA: 67 MM HG (ref 83–108)
PO2 BLDA: 71.9 MM HG (ref 83–108)
PO2 BLDA: 97.8 MM HG (ref 83–108)
POTASSIUM BLD-SCNC: 3.1 MMOL/L (ref 3.5–5.3)
POTASSIUM BLD-SCNC: 3.3 MMOL/L (ref 3.5–5.3)
POTASSIUM BLD-SCNC: 3.3 MMOL/L (ref 3.5–5.3)
POTASSIUM BLD-SCNC: 3.6 MMOL/L (ref 3.5–5.3)
POTASSIUM BLD-SCNC: 3.8 MMOL/L (ref 3.5–5.3)
POTASSIUM BLD-SCNC: 3.9 MMOL/L (ref 3.5–5.3)
POTASSIUM BLD-SCNC: 4.1 MMOL/L (ref 3.5–5.3)
POTASSIUM BLD-SCNC: 4.4 MMOL/L (ref 3.5–5.3)
POTASSIUM BLD-SCNC: 5.4 MMOL/L (ref 3.5–5.3)
POTASSIUM BLDA-SCNC: 4.5 MMOL/L (ref 3.5–5.2)
PROCALCITONIN SERPL-MCNC: <0.25 NG/ML
PROT SERPL-MCNC: 5.7 G/DL (ref 6.3–8.7)
PROT SERPL-MCNC: 6.2 G/DL (ref 6.3–8.7)
PROT SERPL-MCNC: 6.2 G/DL (ref 6.3–8.7)
PROT SERPL-MCNC: 6.5 G/DL (ref 6.3–8.7)
PROT SERPL-MCNC: 6.7 G/DL (ref 6.3–8.7)
PROT SERPL-MCNC: 6.8 G/DL (ref 6.3–8.7)
PROT SERPL-MCNC: 7.6 G/DL (ref 6.3–8.7)
PROT UR QL STRIP: ABNORMAL
PROTHROMBIN TIME: 14.5 SECONDS (ref 11.9–14.6)
PROTHROMBIN TIME: 14.6 SECONDS (ref 11.9–14.6)
RBC # BLD AUTO: 4.54 10*6/MM3 (ref 4.2–5.4)
RBC # BLD AUTO: 4.7 10*6/MM3 (ref 4.2–5.4)
RBC # BLD AUTO: 4.8 10*6/MM3 (ref 4.2–5.4)
RBC # BLD AUTO: 4.93 10*6/MM3 (ref 4.2–5.4)
RBC # BLD AUTO: 4.95 10*6/MM3 (ref 4.2–5.4)
RBC # BLD AUTO: 5.15 10*6/MM3 (ref 4.2–5.4)
RBC # BLD AUTO: 5.23 10*6/MM3 (ref 4.2–5.4)
RBC # BLD AUTO: 5.23 10*6/MM3 (ref 4.2–5.4)
RBC # BLD AUTO: 5.36 10*6/MM3 (ref 4.2–5.4)
RBC # BLD AUTO: 5.38 10*6/MM3 (ref 4.2–5.4)
RBC # UR: ABNORMAL /HPF
REF LAB TEST METHOD: ABNORMAL
RENAL EPI CELLS #/AREA URNS HPF: ABNORMAL /HPF
RH BLD: POSITIVE
SAO2 % BLDCOA: 82 % (ref 94–99)
SAO2 % BLDCOA: 91 % (ref 94–99)
SAO2 % BLDCOA: 97.1 % (ref 94–99)
SAO2 % BLDCOA: 98 % (ref 94–99)
SAO2 % BLDCOA: 98.1 % (ref 94–99)
SAO2 % BLDCOA: 98.5 % (ref 94–99)
SAO2 % BLDCOA: 99 % (ref 94–99)
SAO2 % BLDCOA: 99.8 % (ref 94–99)
SAO2 % BLDCOA: 99.9 % (ref 94–99)
SAO2 % BLDCOA: >100.1 % (ref 94–99)
SET MECH RESP RATE: 14
SET MECH RESP RATE: 22
SODIUM BLD-SCNC: 142 MMOL/L (ref 135–145)
SODIUM BLD-SCNC: 142 MMOL/L (ref 135–145)
SODIUM BLD-SCNC: 143 MMOL/L (ref 135–145)
SODIUM BLD-SCNC: 144 MMOL/L (ref 135–145)
SODIUM BLD-SCNC: 145 MMOL/L (ref 135–145)
SODIUM BLD-SCNC: 145 MMOL/L (ref 135–145)
SODIUM BLD-SCNC: 146 MMOL/L (ref 135–145)
SODIUM BLD-SCNC: 147 MMOL/L (ref 135–145)
SODIUM BLD-SCNC: 147 MMOL/L (ref 135–145)
SODIUM BLDA-SCNC: 147 MMOL/L (ref 136–145)
SP GR UR STRIP: 1.02 (ref 1–1.03)
SQUAMOUS #/AREA URNS HPF: ABNORMAL /HPF
STRESS TARGET HR: 148 BPM
T4 FREE SERPL-MCNC: 1.32 NG/DL (ref 0.78–2.19)
TROPONIN I SERPL-MCNC: 0.06 NG/ML (ref 0–0.03)
TROPONIN I SERPL-MCNC: 0.15 NG/ML (ref 0–0.03)
TROPONIN I SERPL-MCNC: 0.4 NG/ML (ref 0–0.03)
TROPONIN I SERPL-MCNC: 0.41 NG/ML (ref 0–0.03)
TROPONIN I SERPL-MCNC: 0.47 NG/ML (ref 0–0.03)
TSH SERPL DL<=0.05 MIU/L-ACNC: 3.62 MIU/ML (ref 0.47–4.68)
UROBILINOGEN UR QL STRIP: ABNORMAL
VENTILATOR MODE: ABNORMAL
VENTILATOR MODE: AC
VT ON VENT VENT: 470 ML
VT ON VENT VENT: 500 ML
WBC NRBC COR # BLD: 14.99 10*3/MM3 (ref 4.8–10.8)
WBC NRBC COR # BLD: 15.25 10*3/MM3 (ref 4.8–10.8)
WBC NRBC COR # BLD: 15.78 10*3/MM3 (ref 4.8–10.8)
WBC NRBC COR # BLD: 16.98 10*3/MM3 (ref 4.8–10.8)
WBC NRBC COR # BLD: 17.08 10*3/MM3 (ref 4.8–10.8)
WBC NRBC COR # BLD: 17.92 10*3/MM3 (ref 4.8–10.8)
WBC NRBC COR # BLD: 20.13 10*3/MM3 (ref 4.8–10.8)
WBC NRBC COR # BLD: 20.46 10*3/MM3 (ref 4.8–10.8)
WBC NRBC COR # BLD: 20.47 10*3/MM3 (ref 4.8–10.8)
WBC NRBC COR # BLD: 21.17 10*3/MM3 (ref 4.8–10.8)
WBC UR QL AUTO: ABNORMAL /HPF
WHOLE BLOOD HOLD SPECIMEN: NORMAL
WHOLE BLOOD HOLD SPECIMEN: NORMAL

## 2018-01-01 PROCEDURE — 99291 CRITICAL CARE FIRST HOUR: CPT

## 2018-01-01 PROCEDURE — 84484 ASSAY OF TROPONIN QUANT: CPT | Performed by: INTERNAL MEDICINE

## 2018-01-01 PROCEDURE — 83735 ASSAY OF MAGNESIUM: CPT | Performed by: EMERGENCY MEDICINE

## 2018-01-01 PROCEDURE — 81025 URINE PREGNANCY TEST: CPT | Performed by: EMERGENCY MEDICINE

## 2018-01-01 PROCEDURE — 25010000002 PIPERACILLIN SOD-TAZOBACTAM PER 1 G: Performed by: EMERGENCY MEDICINE

## 2018-01-01 PROCEDURE — 80307 DRUG TEST PRSMV CHEM ANLYZR: CPT | Performed by: EMERGENCY MEDICINE

## 2018-01-01 PROCEDURE — 85730 THROMBOPLASTIN TIME PARTIAL: CPT | Performed by: EMERGENCY MEDICINE

## 2018-01-01 PROCEDURE — 82803 BLOOD GASES ANY COMBINATION: CPT

## 2018-01-01 PROCEDURE — 84439 ASSAY OF FREE THYROXINE: CPT | Performed by: EMERGENCY MEDICINE

## 2018-01-01 PROCEDURE — 85025 COMPLETE CBC W/AUTO DIFF WBC: CPT | Performed by: INTERNAL MEDICINE

## 2018-01-01 PROCEDURE — 94003 VENT MGMT INPAT SUBQ DAY: CPT

## 2018-01-01 PROCEDURE — 25010000002 DOPAMINE PER 40 MG: Performed by: INTERNAL MEDICINE

## 2018-01-01 PROCEDURE — 83605 ASSAY OF LACTIC ACID: CPT | Performed by: INTERNAL MEDICINE

## 2018-01-01 PROCEDURE — 5A1945Z RESPIRATORY VENTILATION, 24-96 CONSECUTIVE HOURS: ICD-10-PCS | Performed by: INTERNAL MEDICINE

## 2018-01-01 PROCEDURE — 25010000002 FUROSEMIDE PER 20 MG: Performed by: INTERNAL MEDICINE

## 2018-01-01 PROCEDURE — 81001 URINALYSIS AUTO W/SCOPE: CPT | Performed by: EMERGENCY MEDICINE

## 2018-01-01 PROCEDURE — 93010 ELECTROCARDIOGRAM REPORT: CPT | Performed by: INTERNAL MEDICINE

## 2018-01-01 PROCEDURE — 85610 PROTHROMBIN TIME: CPT | Performed by: INTERNAL MEDICINE

## 2018-01-01 PROCEDURE — 25010000002 PIPERACILLIN SOD-TAZOBACTAM PER 1 G: Performed by: FAMILY MEDICINE

## 2018-01-01 PROCEDURE — 25010000002 PROPOFOL 1000 MG/ML EMULSION: Performed by: EMERGENCY MEDICINE

## 2018-01-01 PROCEDURE — 25010000002 PHENYLEPHRINE 10 MG/ML SOLUTION 1 ML VIAL: Performed by: INTERNAL MEDICINE

## 2018-01-01 PROCEDURE — 84100 ASSAY OF PHOSPHORUS: CPT | Performed by: INTERNAL MEDICINE

## 2018-01-01 PROCEDURE — 83735 ASSAY OF MAGNESIUM: CPT | Performed by: INTERNAL MEDICINE

## 2018-01-01 PROCEDURE — 83880 ASSAY OF NATRIURETIC PEPTIDE: CPT | Performed by: EMERGENCY MEDICINE

## 2018-01-01 PROCEDURE — 83050 HGB METHEMOGLOBIN QUAN: CPT

## 2018-01-01 PROCEDURE — 85610 PROTHROMBIN TIME: CPT | Performed by: EMERGENCY MEDICINE

## 2018-01-01 PROCEDURE — 82962 GLUCOSE BLOOD TEST: CPT

## 2018-01-01 PROCEDURE — C1769 GUIDE WIRE: HCPCS

## 2018-01-01 PROCEDURE — 94799 UNLISTED PULMONARY SVC/PX: CPT

## 2018-01-01 PROCEDURE — 94770: CPT

## 2018-01-01 PROCEDURE — 85025 COMPLETE CBC W/AUTO DIFF WBC: CPT | Performed by: EMERGENCY MEDICINE

## 2018-01-01 PROCEDURE — 71045 X-RAY EXAM CHEST 1 VIEW: CPT

## 2018-01-01 PROCEDURE — 0 IOPAMIDOL 61 % SOLUTION: Performed by: EMERGENCY MEDICINE

## 2018-01-01 PROCEDURE — 84100 ASSAY OF PHOSPHORUS: CPT | Performed by: EMERGENCY MEDICINE

## 2018-01-01 PROCEDURE — 93005 ELECTROCARDIOGRAM TRACING: CPT

## 2018-01-01 PROCEDURE — 25010000002 ENOXAPARIN PER 10 MG: Performed by: INTERNAL MEDICINE

## 2018-01-01 PROCEDURE — 25010000002 FENTANYL CITRATE (PF) 100 MCG/2ML SOLUTION 50 ML VIAL: Performed by: EMERGENCY MEDICINE

## 2018-01-01 PROCEDURE — 82550 ASSAY OF CK (CPK): CPT | Performed by: EMERGENCY MEDICINE

## 2018-01-01 PROCEDURE — 25010000002 VANCOMYCIN PER 500 MG: Performed by: FAMILY MEDICINE

## 2018-01-01 PROCEDURE — 70450 CT HEAD/BRAIN W/O DYE: CPT

## 2018-01-01 PROCEDURE — 82553 CREATINE MB FRACTION: CPT | Performed by: INTERNAL MEDICINE

## 2018-01-01 PROCEDURE — 82330 ASSAY OF CALCIUM: CPT

## 2018-01-01 PROCEDURE — 86850 RBC ANTIBODY SCREEN: CPT | Performed by: EMERGENCY MEDICINE

## 2018-01-01 PROCEDURE — 87804 INFLUENZA ASSAY W/OPTIC: CPT | Performed by: NURSE PRACTITIONER

## 2018-01-01 PROCEDURE — 84703 CHORIONIC GONADOTROPIN ASSAY: CPT | Performed by: INTERNAL MEDICINE

## 2018-01-01 PROCEDURE — 82553 CREATINE MB FRACTION: CPT | Performed by: EMERGENCY MEDICINE

## 2018-01-01 PROCEDURE — 94002 VENT MGMT INPAT INIT DAY: CPT

## 2018-01-01 PROCEDURE — 25010000002 CEFTRIAXONE 1 G/10ML IV PUSH SYRINGE KIT (PAD): Performed by: INTERNAL MEDICINE

## 2018-01-01 PROCEDURE — 84145 PROCALCITONIN (PCT): CPT | Performed by: EMERGENCY MEDICINE

## 2018-01-01 PROCEDURE — 85730 THROMBOPLASTIN TIME PARTIAL: CPT | Performed by: INTERNAL MEDICINE

## 2018-01-01 PROCEDURE — 87205 SMEAR GRAM STAIN: CPT | Performed by: NURSE PRACTITIONER

## 2018-01-01 PROCEDURE — 80053 COMPREHEN METABOLIC PANEL: CPT | Performed by: EMERGENCY MEDICINE

## 2018-01-01 PROCEDURE — 93306 TTE W/DOPPLER COMPLETE: CPT

## 2018-01-01 PROCEDURE — 25010000002 AZITHROMYCIN PER 500 MG: Performed by: EMERGENCY MEDICINE

## 2018-01-01 PROCEDURE — 25010000002 LORAZEPAM PER 2 MG: Performed by: INTERNAL MEDICINE

## 2018-01-01 PROCEDURE — 83605 ASSAY OF LACTIC ACID: CPT | Performed by: EMERGENCY MEDICINE

## 2018-01-01 PROCEDURE — 51702 INSERT TEMP BLADDER CATH: CPT

## 2018-01-01 PROCEDURE — 36600 WITHDRAWAL OF ARTERIAL BLOOD: CPT

## 2018-01-01 PROCEDURE — 82550 ASSAY OF CK (CPK): CPT | Performed by: INTERNAL MEDICINE

## 2018-01-01 PROCEDURE — 80053 COMPREHEN METABOLIC PANEL: CPT | Performed by: INTERNAL MEDICINE

## 2018-01-01 PROCEDURE — 93005 ELECTROCARDIOGRAM TRACING: CPT | Performed by: EMERGENCY MEDICINE

## 2018-01-01 PROCEDURE — 25010000003 POTASSIUM CHLORIDE PER 2 MEQ: Performed by: EMERGENCY MEDICINE

## 2018-01-01 PROCEDURE — 25010000002 PHENYLEPHRINE 10 MG/ML SOLUTION 5 ML VIAL: Performed by: INTERNAL MEDICINE

## 2018-01-01 PROCEDURE — 87086 URINE CULTURE/COLONY COUNT: CPT | Performed by: EMERGENCY MEDICINE

## 2018-01-01 PROCEDURE — 86901 BLOOD TYPING SEROLOGIC RH(D): CPT | Performed by: EMERGENCY MEDICINE

## 2018-01-01 PROCEDURE — 71260 CT THORAX DX C+: CPT

## 2018-01-01 PROCEDURE — 84484 ASSAY OF TROPONIN QUANT: CPT | Performed by: EMERGENCY MEDICINE

## 2018-01-01 PROCEDURE — 93306 TTE W/DOPPLER COMPLETE: CPT | Performed by: INTERNAL MEDICINE

## 2018-01-01 PROCEDURE — 84703 CHORIONIC GONADOTROPIN ASSAY: CPT | Performed by: EMERGENCY MEDICINE

## 2018-01-01 PROCEDURE — 86900 BLOOD TYPING SEROLOGIC ABO: CPT | Performed by: EMERGENCY MEDICINE

## 2018-01-01 PROCEDURE — 84443 ASSAY THYROID STIM HORMONE: CPT | Performed by: EMERGENCY MEDICINE

## 2018-01-01 PROCEDURE — 72125 CT NECK SPINE W/O DYE: CPT

## 2018-01-01 PROCEDURE — 25010000002 FUROSEMIDE PER 20 MG: Performed by: FAMILY MEDICINE

## 2018-01-01 PROCEDURE — 25010000003 MORPHINE PER 100 MG: Performed by: INTERNAL MEDICINE

## 2018-01-01 PROCEDURE — 25010000002 EPINEPHRINE PF 1 MG/10ML SOLUTION PREFILLED SYRINGE: Performed by: INTERNAL MEDICINE

## 2018-01-01 PROCEDURE — 74177 CT ABD & PELVIS W/CONTRAST: CPT

## 2018-01-01 PROCEDURE — 82375 ASSAY CARBOXYHB QUANT: CPT

## 2018-01-01 PROCEDURE — 87040 BLOOD CULTURE FOR BACTERIA: CPT | Performed by: FAMILY MEDICINE

## 2018-01-01 PROCEDURE — 87070 CULTURE OTHR SPECIMN AEROBIC: CPT | Performed by: NURSE PRACTITIONER

## 2018-01-01 PROCEDURE — 25010000002 DOPAMINE PER 40 MG: Performed by: FAMILY MEDICINE

## 2018-01-01 PROCEDURE — 99254 IP/OBS CNSLTJ NEW/EST MOD 60: CPT | Performed by: INTERNAL MEDICINE

## 2018-01-01 PROCEDURE — 25010000002 PERFLUTREN 6.52 MG/ML SUSPENSION: Performed by: FAMILY MEDICINE

## 2018-01-01 PROCEDURE — 92950 HEART/LUNG RESUSCITATION CPR: CPT

## 2018-01-01 PROCEDURE — 84484 ASSAY OF TROPONIN QUANT: CPT | Performed by: FAMILY MEDICINE

## 2018-01-01 PROCEDURE — 82805 BLOOD GASES W/O2 SATURATION: CPT

## 2018-01-01 RX ORDER — BENZONATATE 200 MG/1
200 CAPSULE ORAL 3 TIMES DAILY PRN
COMMUNITY
Start: 2018-01-01

## 2018-01-01 RX ORDER — SODIUM CHLORIDE 9 MG/ML
INJECTION, SOLUTION INTRAVENOUS
Status: DISCONTINUED | OUTPATIENT
Start: 2018-01-01 | End: 2018-03-12 | Stop reason: HOSPADM

## 2018-01-01 RX ORDER — POTASSIUM CHLORIDE 29.8 MG/ML
20 INJECTION INTRAVENOUS
Status: DISCONTINUED | OUTPATIENT
Start: 2018-01-01 | End: 2018-01-01 | Stop reason: SDUPTHER

## 2018-01-01 RX ORDER — DEXTROSE MONOHYDRATE 25 G/50ML
25-50 INJECTION, SOLUTION INTRAVENOUS
Status: DISCONTINUED | OUTPATIENT
Start: 2018-01-01 | End: 2018-03-12 | Stop reason: HOSPADM

## 2018-01-01 RX ORDER — MINERAL OIL AND WHITE PETROLATUM 150; 830 MG/G; MG/G
OINTMENT OPHTHALMIC
Status: DISCONTINUED | OUTPATIENT
Start: 2018-01-01 | End: 2018-03-12 | Stop reason: HOSPADM

## 2018-01-01 RX ORDER — PANTOPRAZOLE SODIUM 40 MG/10ML
40 INJECTION, POWDER, LYOPHILIZED, FOR SOLUTION INTRAVENOUS
Status: DISCONTINUED | OUTPATIENT
Start: 2018-01-01 | End: 2018-03-12 | Stop reason: HOSPADM

## 2018-01-01 RX ORDER — POTASSIUM CHLORIDE 29.8 MG/ML
20 INJECTION INTRAVENOUS
Status: DISPENSED | OUTPATIENT
Start: 2018-01-01 | End: 2018-01-01

## 2018-01-01 RX ORDER — TRAZODONE HYDROCHLORIDE 50 MG/1
150 TABLET ORAL NIGHTLY
COMMUNITY

## 2018-01-01 RX ORDER — MAGNESIUM SULFATE HEPTAHYDRATE 40 MG/ML
4 INJECTION, SOLUTION INTRAVENOUS EVERY 6 HOURS PRN
Status: ACTIVE | OUTPATIENT
Start: 2018-01-01 | End: 2018-01-01

## 2018-01-01 RX ORDER — DOPAMINE HYDROCHLORIDE 160 MG/100ML
2-20 INJECTION, SOLUTION INTRAVENOUS
Status: DISCONTINUED | OUTPATIENT
Start: 2018-01-01 | End: 2018-01-01

## 2018-01-01 RX ORDER — FUROSEMIDE 10 MG/ML
60 INJECTION INTRAMUSCULAR; INTRAVENOUS ONCE
Status: DISCONTINUED | OUTPATIENT
Start: 2018-01-01 | End: 2018-03-12 | Stop reason: HOSPADM

## 2018-01-01 RX ORDER — DEXTROMETHORPHAN HYDROBROMIDE AND PROMETHAZINE HYDROCHLORIDE 15; 6.25 MG/5ML; MG/5ML
5 SYRUP ORAL NIGHTLY PRN
COMMUNITY
Start: 2018-01-01

## 2018-01-01 RX ORDER — MINERAL OIL AND WHITE PETROLATUM 150; 830 MG/G; MG/G
OINTMENT OPHTHALMIC
Status: DISCONTINUED | OUTPATIENT
Start: 2018-01-01 | End: 2018-01-01 | Stop reason: SDUPTHER

## 2018-01-01 RX ORDER — IBUPROFEN 800 MG/1
800 TABLET ORAL DAILY
COMMUNITY

## 2018-01-01 RX ORDER — VECURONIUM BROMIDE 1 MG/ML
0.06 INJECTION, POWDER, LYOPHILIZED, FOR SOLUTION INTRAVENOUS
Status: DISPENSED | OUTPATIENT
Start: 2018-01-01 | End: 2018-01-01

## 2018-01-01 RX ORDER — ATROPINE SULFATE 1 MG/ML
INJECTION, SOLUTION INTRAMUSCULAR; INTRAVENOUS; SUBCUTANEOUS
Status: DISCONTINUED | OUTPATIENT
Start: 2018-01-01 | End: 2018-03-12 | Stop reason: HOSPADM

## 2018-01-01 RX ORDER — FUROSEMIDE 10 MG/ML
60 INJECTION INTRAMUSCULAR; INTRAVENOUS ONCE
Status: COMPLETED | OUTPATIENT
Start: 2018-01-01 | End: 2018-01-01

## 2018-01-01 RX ORDER — VECURONIUM BROMIDE 1 MG/ML
0.06 INJECTION, POWDER, LYOPHILIZED, FOR SOLUTION INTRAVENOUS
Status: DISCONTINUED | OUTPATIENT
Start: 2018-01-01 | End: 2018-01-01 | Stop reason: SDUPTHER

## 2018-01-01 RX ORDER — FUROSEMIDE 10 MG/ML
40 INJECTION INTRAMUSCULAR; INTRAVENOUS EVERY 12 HOURS
Status: DISCONTINUED | OUTPATIENT
Start: 2018-01-01 | End: 2018-03-12 | Stop reason: HOSPADM

## 2018-01-01 RX ORDER — HYDROCHLOROTHIAZIDE 12.5 MG/1
12.5 CAPSULE, GELATIN COATED ORAL EVERY MORNING
COMMUNITY

## 2018-01-01 RX ORDER — LORAZEPAM 2 MG/ML
1 INJECTION INTRAMUSCULAR
Status: DISCONTINUED | OUTPATIENT
Start: 2018-01-01 | End: 2018-03-12 | Stop reason: HOSPADM

## 2018-01-01 RX ORDER — CYCLOBENZAPRINE HCL 10 MG
10 TABLET ORAL DAILY
COMMUNITY

## 2018-01-01 RX ORDER — SODIUM CHLORIDE 0.9 % (FLUSH) 0.9 %
1-10 SYRINGE (ML) INJECTION AS NEEDED
Status: DISCONTINUED | OUTPATIENT
Start: 2018-01-01 | End: 2018-03-12 | Stop reason: HOSPADM

## 2018-01-01 RX ORDER — ALBUTEROL SULFATE 90 UG/1
2 AEROSOL, METERED RESPIRATORY (INHALATION) EVERY 4 HOURS PRN
COMMUNITY

## 2018-01-01 RX ORDER — HYDRALAZINE HYDROCHLORIDE 20 MG/ML
20 INJECTION INTRAMUSCULAR; INTRAVENOUS ONCE
Status: DISCONTINUED | OUTPATIENT
Start: 2018-01-01 | End: 2018-03-12 | Stop reason: HOSPADM

## 2018-01-01 RX ORDER — MORPHINE SULFATE 1 MG/ML
1 INJECTION, SOLUTION INTRAVENOUS CONTINUOUS
Status: DISCONTINUED | OUTPATIENT
Start: 2018-01-01 | End: 2018-03-12 | Stop reason: HOSPADM

## 2018-01-01 RX ORDER — MAGNESIUM SULFATE HEPTAHYDRATE 40 MG/ML
4 INJECTION, SOLUTION INTRAVENOUS ONCE
Status: COMPLETED | OUTPATIENT
Start: 2018-01-01 | End: 2018-01-01

## 2018-01-01 RX ORDER — IPRATROPIUM BROMIDE AND ALBUTEROL SULFATE 2.5; .5 MG/3ML; MG/3ML
3 SOLUTION RESPIRATORY (INHALATION)
Status: DISCONTINUED | OUTPATIENT
Start: 2018-01-01 | End: 2018-03-12 | Stop reason: HOSPADM

## 2018-01-01 RX ADMIN — PHENYLEPHRINE HYDROCHLORIDE 3 MCG/KG/MIN: 10 INJECTION INTRAVENOUS at 03:22

## 2018-01-01 RX ADMIN — MINERAL OIL AND WHITE PETROLATUM: 150; 830 OINTMENT OPHTHALMIC at 08:30

## 2018-01-01 RX ADMIN — MINERAL OIL AND WHITE PETROLATUM: 150; 830 OINTMENT OPHTHALMIC at 02:04

## 2018-01-01 RX ADMIN — PROPOFOL 35 MCG/KG/MIN: 10 INJECTION, EMULSION INTRAVENOUS at 14:47

## 2018-01-01 RX ADMIN — PROPOFOL 30 MCG/KG/MIN: 10 INJECTION, EMULSION INTRAVENOUS at 14:59

## 2018-01-01 RX ADMIN — POTASSIUM CHLORIDE 20 MEQ: 400 INJECTION, SOLUTION INTRAVENOUS at 04:20

## 2018-01-01 RX ADMIN — PROPOFOL 5 MCG/KG/MIN: 10 INJECTION, EMULSION INTRAVENOUS at 21:55

## 2018-01-01 RX ADMIN — PROPOFOL 40 MCG/KG/MIN: 10 INJECTION, EMULSION INTRAVENOUS at 18:01

## 2018-01-01 RX ADMIN — IOPAMIDOL 100 ML: 612 INJECTION, SOLUTION INTRAVENOUS at 21:31

## 2018-01-01 RX ADMIN — MINERAL OIL AND WHITE PETROLATUM: 150; 830 OINTMENT OPHTHALMIC at 20:50

## 2018-01-01 RX ADMIN — POTASSIUM CHLORIDE 20 MEQ: 400 INJECTION, SOLUTION INTRAVENOUS at 02:45

## 2018-01-01 RX ADMIN — PROPOFOL 30 MCG/KG/MIN: 10 INJECTION, EMULSION INTRAVENOUS at 11:44

## 2018-01-01 RX ADMIN — PROPOFOL 35 MCG/KG/MIN: 10 INJECTION, EMULSION INTRAVENOUS at 20:57

## 2018-01-01 RX ADMIN — MINERAL OIL AND WHITE PETROLATUM: 150; 830 OINTMENT OPHTHALMIC at 04:04

## 2018-01-01 RX ADMIN — EPINEPHRINE 1 MG: 0.1 INJECTION, SOLUTION ENDOTRACHEAL; INTRACARDIAC; INTRAVENOUS at 06:19

## 2018-01-01 RX ADMIN — TAZOBACTAM SODIUM AND PIPERACILLIN SODIUM 4.5 G: 500; 4 INJECTION, SOLUTION INTRAVENOUS at 16:26

## 2018-01-01 RX ADMIN — SODIUM BICARBONATE 100 MEQ: 84 INJECTION, SOLUTION INTRAVENOUS at 11:10

## 2018-01-01 RX ADMIN — MINERAL OIL AND WHITE PETROLATUM: 150; 830 OINTMENT OPHTHALMIC at 00:33

## 2018-01-01 RX ADMIN — MAGNESIUM SULFATE HEPTAHYDRATE 4 G: 40 INJECTION, SOLUTION INTRAVENOUS at 01:15

## 2018-01-01 RX ADMIN — PHENYLEPHRINE HYDROCHLORIDE 0.5 MCG/KG/MIN: 10 INJECTION INTRAVENOUS at 04:55

## 2018-01-01 RX ADMIN — SODIUM BICARBONATE 50 MEQ: 84 INJECTION, SOLUTION INTRAVENOUS at 06:21

## 2018-01-01 RX ADMIN — SODIUM BICARBONATE 75 ML/HR: 84 INJECTION, SOLUTION INTRAVENOUS at 23:32

## 2018-01-01 RX ADMIN — TAZOBACTAM SODIUM AND PIPERACILLIN SODIUM 4.5 G: 500; 4 INJECTION, SOLUTION INTRAVENOUS at 11:15

## 2018-01-01 RX ADMIN — PROPOFOL 50 MCG/KG/MIN: 10 INJECTION, EMULSION INTRAVENOUS at 05:41

## 2018-01-01 RX ADMIN — MINERAL OIL AND WHITE PETROLATUM: 150; 830 OINTMENT OPHTHALMIC at 23:23

## 2018-01-01 RX ADMIN — MINERAL OIL AND WHITE PETROLATUM: 150; 830 OINTMENT OPHTHALMIC at 10:58

## 2018-01-01 RX ADMIN — FUROSEMIDE 40 MG: 10 INJECTION, SOLUTION INTRAMUSCULAR; INTRAVENOUS at 18:22

## 2018-01-01 RX ADMIN — Medication 50 MEQ: at 09:40

## 2018-01-01 RX ADMIN — IPRATROPIUM BROMIDE AND ALBUTEROL SULFATE 3 ML: 2.5; .5 SOLUTION RESPIRATORY (INHALATION) at 20:41

## 2018-01-01 RX ADMIN — TAZOBACTAM SODIUM AND PIPERACILLIN SODIUM 4.5 G: 500; 4 INJECTION, SOLUTION INTRAVENOUS at 17:08

## 2018-01-01 RX ADMIN — MINERAL OIL AND WHITE PETROLATUM: 150; 830 OINTMENT OPHTHALMIC at 16:27

## 2018-01-01 RX ADMIN — MINERAL OIL AND WHITE PETROLATUM: 150; 830 OINTMENT OPHTHALMIC at 21:55

## 2018-01-01 RX ADMIN — FENTANYL CITRATE 50 MCG/HR: 50 INJECTION INTRAVENOUS at 00:06

## 2018-01-01 RX ADMIN — MINERAL OIL AND WHITE PETROLATUM: 150; 830 OINTMENT OPHTHALMIC at 20:36

## 2018-01-01 RX ADMIN — FENTANYL CITRATE 50 MCG/HR: 50 INJECTION INTRAVENOUS at 00:07

## 2018-01-01 RX ADMIN — SODIUM BICARBONATE 50 MEQ: 84 INJECTION, SOLUTION INTRAVENOUS at 20:31

## 2018-01-01 RX ADMIN — IPRATROPIUM BROMIDE AND ALBUTEROL SULFATE 3 ML: 2.5; .5 SOLUTION RESPIRATORY (INHALATION) at 15:34

## 2018-01-01 RX ADMIN — EPINEPHRINE 1 MG: 0.1 INJECTION, SOLUTION ENDOTRACHEAL; INTRACARDIAC; INTRAVENOUS at 06:13

## 2018-01-01 RX ADMIN — MINERAL OIL AND WHITE PETROLATUM: 150; 830 OINTMENT OPHTHALMIC at 08:33

## 2018-01-01 RX ADMIN — NOREPINEPHRINE BITARTRATE 0.3 MCG/KG/MIN: 1 INJECTION INTRAVENOUS at 10:55

## 2018-01-01 RX ADMIN — ATROPINE SULFATE 1 MG: 1 INJECTION, SOLUTION INTRAMUSCULAR; INTRAVENOUS; SUBCUTANEOUS at 06:01

## 2018-01-01 RX ADMIN — SODIUM BICARBONATE 75 ML/HR: 84 INJECTION, SOLUTION INTRAVENOUS at 10:07

## 2018-01-01 RX ADMIN — MINERAL OIL AND WHITE PETROLATUM: 150; 830 OINTMENT OPHTHALMIC at 18:42

## 2018-01-01 RX ADMIN — PHENYLEPHRINE HYDROCHLORIDE 0.5 MCG/KG/MIN: 10 INJECTION INTRAVENOUS at 04:56

## 2018-01-01 RX ADMIN — PANTOPRAZOLE SODIUM 40 MG: 40 INJECTION, POWDER, FOR SOLUTION INTRAVENOUS at 06:28

## 2018-01-01 RX ADMIN — PHENYLEPHRINE HYDROCHLORIDE 2.2 MCG/KG/MIN: 10 INJECTION INTRAVENOUS at 08:01

## 2018-01-01 RX ADMIN — PHENYLEPHRINE HYDROCHLORIDE 2.6 MCG/KG/MIN: 10 INJECTION INTRAVENOUS at 21:45

## 2018-01-01 RX ADMIN — VANCOMYCIN HYDROCHLORIDE: 1 INJECTION, POWDER, LYOPHILIZED, FOR SOLUTION INTRAVENOUS at 11:38

## 2018-01-01 RX ADMIN — SODIUM BICARBONATE 50 MEQ: 84 INJECTION, SOLUTION INTRAVENOUS at 09:40

## 2018-01-01 RX ADMIN — SODIUM BICARBONATE 75 ML/HR: 84 INJECTION, SOLUTION INTRAVENOUS at 14:36

## 2018-01-01 RX ADMIN — MINERAL OIL AND WHITE PETROLATUM: 150; 830 OINTMENT OPHTHALMIC at 04:32

## 2018-01-01 RX ADMIN — MINERAL OIL AND WHITE PETROLATUM: 150; 830 OINTMENT OPHTHALMIC at 16:30

## 2018-01-01 RX ADMIN — MINERAL OIL AND WHITE PETROLATUM: 150; 830 OINTMENT OPHTHALMIC at 11:38

## 2018-01-01 RX ADMIN — TAZOBACTAM SODIUM AND PIPERACILLIN SODIUM 4.5 G: 500; 4 INJECTION, SOLUTION INTRAVENOUS at 08:43

## 2018-01-01 RX ADMIN — PERFLUTREN 8.48 MG: 6.52 INJECTION, SUSPENSION INTRAVENOUS at 11:06

## 2018-01-01 RX ADMIN — MINERAL OIL AND WHITE PETROLATUM: 150; 830 OINTMENT OPHTHALMIC at 14:36

## 2018-01-01 RX ADMIN — IPRATROPIUM BROMIDE AND ALBUTEROL SULFATE 3 ML: 2.5; .5 SOLUTION RESPIRATORY (INHALATION) at 19:07

## 2018-01-01 RX ADMIN — MORPHINE SULFATE: 25 INJECTION, SOLUTION, CONCENTRATE INTRAVENOUS at 23:03

## 2018-01-01 RX ADMIN — SODIUM CHLORIDE 999 ML/HR: 9 INJECTION, SOLUTION INTRAVENOUS at 07:52

## 2018-01-01 RX ADMIN — IPRATROPIUM BROMIDE AND ALBUTEROL SULFATE 3 ML: 2.5; .5 SOLUTION RESPIRATORY (INHALATION) at 07:42

## 2018-01-01 RX ADMIN — ENOXAPARIN SODIUM 40 MG: 40 INJECTION SUBCUTANEOUS at 11:39

## 2018-01-01 RX ADMIN — MINERAL OIL AND WHITE PETROLATUM: 150; 830 OINTMENT OPHTHALMIC at 14:30

## 2018-01-01 RX ADMIN — PROPOFOL 50 MCG/KG/MIN: 10 INJECTION, EMULSION INTRAVENOUS at 09:45

## 2018-01-01 RX ADMIN — EPINEPHRINE 1 MG: 0.1 INJECTION, SOLUTION ENDOTRACHEAL; INTRACARDIAC; INTRAVENOUS at 05:59

## 2018-01-01 RX ADMIN — FUROSEMIDE 60 MG: 10 INJECTION, SOLUTION INTRAMUSCULAR; INTRAVENOUS at 11:09

## 2018-01-01 RX ADMIN — PROPOFOL 40 MCG/KG/MIN: 10 INJECTION, EMULSION INTRAVENOUS at 23:14

## 2018-01-01 RX ADMIN — AZITHROMYCIN MONOHYDRATE 500 MG: 500 INJECTION, POWDER, LYOPHILIZED, FOR SOLUTION INTRAVENOUS at 04:40

## 2018-01-01 RX ADMIN — IPRATROPIUM BROMIDE AND ALBUTEROL SULFATE 3 ML: 2.5; .5 SOLUTION RESPIRATORY (INHALATION) at 16:07

## 2018-01-01 RX ADMIN — NOREPINEPHRINE BITARTRATE 0.28 MCG/KG/MIN: 1 INJECTION INTRAVENOUS at 09:01

## 2018-01-01 RX ADMIN — PROPOFOL 35 MCG/KG/MIN: 10 INJECTION, EMULSION INTRAVENOUS at 05:58

## 2018-01-01 RX ADMIN — TAZOBACTAM SODIUM AND PIPERACILLIN SODIUM 4.5 G: 500; 4 INJECTION, SOLUTION INTRAVENOUS at 00:06

## 2018-01-01 RX ADMIN — PROPOFOL 40 MCG/KG/MIN: 10 INJECTION, EMULSION INTRAVENOUS at 03:02

## 2018-01-01 RX ADMIN — MINERAL OIL AND WHITE PETROLATUM: 150; 830 OINTMENT OPHTHALMIC at 06:59

## 2018-01-01 RX ADMIN — IPRATROPIUM BROMIDE AND ALBUTEROL SULFATE 3 ML: 2.5; .5 SOLUTION RESPIRATORY (INHALATION) at 12:07

## 2018-01-01 RX ADMIN — FENTANYL CITRATE 50 MCG/HR: 50 INJECTION INTRAVENOUS at 23:15

## 2018-01-01 RX ADMIN — SODIUM BICARBONATE 50 MEQ: 84 INJECTION, SOLUTION INTRAVENOUS at 06:04

## 2018-01-01 RX ADMIN — MINERAL OIL AND WHITE PETROLATUM: 150; 830 OINTMENT OPHTHALMIC at 18:03

## 2018-01-01 RX ADMIN — ATROPINE SULFATE 1 MG: 1 INJECTION, SOLUTION INTRAMUSCULAR; INTRAVENOUS; SUBCUTANEOUS at 07:47

## 2018-01-01 RX ADMIN — PROPOFOL 35 MCG/KG/MIN: 10 INJECTION, EMULSION INTRAVENOUS at 18:02

## 2018-01-01 RX ADMIN — NOREPINEPHRINE BITARTRATE 0.16 MCG/KG/MIN: 1 INJECTION INTRAVENOUS at 19:53

## 2018-01-01 RX ADMIN — PROPOFOL 40 MCG/KG/MIN: 10 INJECTION, EMULSION INTRAVENOUS at 21:00

## 2018-01-01 RX ADMIN — MINERAL OIL AND WHITE PETROLATUM: 150; 830 OINTMENT OPHTHALMIC at 22:04

## 2018-01-01 RX ADMIN — NOREPINEPHRINE BITARTRATE 0.02 MCG/KG/MIN: 1 INJECTION INTRAVENOUS at 02:00

## 2018-01-01 RX ADMIN — MINERAL OIL AND WHITE PETROLATUM: 150; 830 OINTMENT OPHTHALMIC at 10:02

## 2018-01-01 RX ADMIN — MINERAL OIL AND WHITE PETROLATUM: 150; 830 OINTMENT OPHTHALMIC at 11:55

## 2018-01-01 RX ADMIN — VECURONIUM BROMIDE 10 MG: 1 INJECTION, POWDER, LYOPHILIZED, FOR SOLUTION INTRAVENOUS at 04:12

## 2018-01-01 RX ADMIN — PANTOPRAZOLE SODIUM 40 MG: 40 INJECTION, POWDER, FOR SOLUTION INTRAVENOUS at 11:39

## 2018-01-01 RX ADMIN — DOPAMINE HYDROCHLORIDE 20 MCG/KG/MIN: 160 INJECTION, SOLUTION INTRAVENOUS at 06:15

## 2018-01-01 RX ADMIN — NOREPINEPHRINE BITARTRATE 0.3 MCG/KG/MIN: 1 INJECTION INTRAVENOUS at 15:21

## 2018-01-01 RX ADMIN — TAZOBACTAM SODIUM AND PIPERACILLIN SODIUM 4.5 G: 500; 4 INJECTION, SOLUTION INTRAVENOUS at 23:42

## 2018-01-01 RX ADMIN — Medication 100 MEQ: at 11:10

## 2018-01-01 RX ADMIN — MINERAL OIL AND WHITE PETROLATUM: 150; 830 OINTMENT OPHTHALMIC at 06:28

## 2018-01-01 RX ADMIN — IPRATROPIUM BROMIDE AND ALBUTEROL SULFATE 3 ML: 2.5; .5 SOLUTION RESPIRATORY (INHALATION) at 11:29

## 2018-01-01 RX ADMIN — VANCOMYCIN HYDROCHLORIDE: 1 INJECTION, POWDER, LYOPHILIZED, FOR SOLUTION INTRAVENOUS at 12:54

## 2018-01-01 RX ADMIN — PHENYLEPHRINE HYDROCHLORIDE 2.6 MCG/KG/MIN: 10 INJECTION INTRAVENOUS at 17:00

## 2018-01-01 RX ADMIN — MAGNESIUM SULFATE HEPTAHYDRATE 4 G: 40 INJECTION, SOLUTION INTRAVENOUS at 00:05

## 2018-01-01 RX ADMIN — PROPOFOL 35 MCG/KG/MIN: 10 INJECTION, EMULSION INTRAVENOUS at 11:36

## 2018-01-01 RX ADMIN — Medication 1 G: at 01:15

## 2018-01-01 RX ADMIN — PROPOFOL 35 MCG/KG/MIN: 10 INJECTION, EMULSION INTRAVENOUS at 09:05

## 2018-01-01 RX ADMIN — ATROPINE SULFATE 1 MG: 1 INJECTION, SOLUTION INTRAMUSCULAR; INTRAVENOUS; SUBCUTANEOUS at 06:14

## 2018-01-01 RX ADMIN — NOREPINEPHRINE BITARTRATE 0.3 MCG/KG/MIN: 1 INJECTION INTRAVENOUS at 21:29

## 2018-01-01 RX ADMIN — MINERAL OIL AND WHITE PETROLATUM: 150; 830 OINTMENT OPHTHALMIC at 03:17

## 2018-01-01 RX ADMIN — MINERAL OIL AND WHITE PETROLATUM: 150; 830 OINTMENT OPHTHALMIC at 23:41

## 2018-01-01 RX ADMIN — DOPAMINE HYDROCHLORIDE 2 MCG/KG/MIN: 40 INJECTION, SOLUTION, CONCENTRATE INTRAVENOUS at 03:29

## 2018-01-01 RX ADMIN — DEXTROSE MONOHYDRATE 25 ML: 25 INJECTION, SOLUTION INTRAVENOUS at 20:42

## 2018-01-01 RX ADMIN — LORAZEPAM 1 MG: 2 INJECTION, SOLUTION INTRAMUSCULAR; INTRAVENOUS at 23:20

## 2018-02-06 ENCOUNTER — TELEPHONE (OUTPATIENT)
Dept: NEUROLOGY | Age: 48
End: 2018-02-06

## 2018-02-22 ENCOUNTER — OFFICE VISIT (OUTPATIENT)
Dept: URGENT CARE | Age: 48
End: 2018-02-22
Payer: COMMERCIAL

## 2018-02-22 VITALS
BODY MASS INDEX: 43.4 KG/M2 | RESPIRATION RATE: 20 BRPM | SYSTOLIC BLOOD PRESSURE: 138 MMHG | OXYGEN SATURATION: 98 % | HEIGHT: 69 IN | HEART RATE: 91 BPM | WEIGHT: 293 LBS | DIASTOLIC BLOOD PRESSURE: 98 MMHG | TEMPERATURE: 97.3 F

## 2018-02-22 DIAGNOSIS — R05.9 COUGH: Primary | ICD-10-CM

## 2018-02-22 LAB
INFLUENZA A ANTIBODY: NEGATIVE
INFLUENZA B ANTIBODY: NEGATIVE

## 2018-02-22 PROCEDURE — 87804 INFLUENZA ASSAY W/OPTIC: CPT | Performed by: NURSE PRACTITIONER

## 2018-02-22 PROCEDURE — 99213 OFFICE O/P EST LOW 20 MIN: CPT | Performed by: NURSE PRACTITIONER

## 2018-02-22 RX ORDER — DEXTROMETHORPHAN HYDROBROMIDE AND PROMETHAZINE HYDROCHLORIDE 15; 6.25 MG/5ML; MG/5ML
5 SYRUP ORAL NIGHTLY PRN
Qty: 180 ML | Refills: 0 | Status: SHIPPED | OUTPATIENT
Start: 2018-02-22

## 2018-02-22 RX ORDER — TRAZODONE HYDROCHLORIDE 150 MG/1
150 TABLET ORAL NIGHTLY
COMMUNITY
Start: 2018-02-02

## 2018-02-22 RX ORDER — CYCLOBENZAPRINE HCL 10 MG
10 TABLET ORAL DAILY
COMMUNITY
Start: 2018-02-02

## 2018-02-22 RX ORDER — METHYLPREDNISOLONE 4 MG/1
TABLET ORAL
Qty: 1 KIT | Refills: 0 | Status: SHIPPED | OUTPATIENT
Start: 2018-02-22 | End: 2018-02-28

## 2018-02-22 RX ORDER — IBUPROFEN 800 MG/1
800 TABLET ORAL DAILY
COMMUNITY
Start: 2018-02-02

## 2018-02-22 RX ORDER — BENZONATATE 200 MG/1
200 CAPSULE ORAL 3 TIMES DAILY PRN
Qty: 30 CAPSULE | Refills: 0 | Status: SHIPPED | OUTPATIENT
Start: 2018-02-22 | End: 2018-03-01

## 2018-02-22 RX ORDER — HYDROCHLOROTHIAZIDE 12.5 MG/1
12.5 CAPSULE, GELATIN COATED ORAL EVERY MORNING
COMMUNITY
Start: 2018-02-02

## 2018-02-22 ASSESSMENT — ENCOUNTER SYMPTOMS
NAUSEA: 0
COUGH: 1
DIARRHEA: 0
VOMITING: 0
ABDOMINAL PAIN: 0
SHORTNESS OF BREATH: 1

## 2018-02-22 NOTE — PROGRESS NOTES
[address] [facility]  [address] [facility] [place] [address]  Dept: [phone]  Loc: [phone]    Lisa Vo is a 56 y.o. female who presents today for her medical conditions/complaints as noted below. Lisa Vo is c/o of Cough (x 1 month); Congestion (x 1 month); and Shortness of Breath        HPI:     Cough   This is a recurrent problem. Episode onset: more than a month ago. The problem has been unchanged. The problem occurs constantly. The cough is non-productive. Associated symptoms include nasal congestion, postnasal drip and shortness of breath. Pertinent negatives include no chills, fever or rash. The symptoms are aggravated by cold air. She has tried OTC cough suppressant (steroid shot) for the symptoms. The treatment provided no relief. Her past medical history is significant for environmental allergies. She states she has been seen at [facility] for this cough. She states she has had a chest xray which was negative for lung diease. She has had an inhaler and some tessalon perls with no relief of symptoms. She states that flonase and antihistamines do not help. Past Medical History:   Diagnosis Date    Asthma     Hypertension       Past Surgical History:   Procedure Laterality Date    HYSTERECTOMY, TOTAL ABDOMINAL         No family history on file.     Social History   Substance Use Topics    Smoking status: Never Smoker    Smokeless tobacco: Never Used    Alcohol use No      Current Outpatient Prescriptions   Medication Sig Dispense Refill    PROAIR  (90 Base) MCG/ACT inhaler Inhale 2 puffs into the lungs 4 times daily       cyclobenzaprine (FLEXERIL) 10 MG tablet Take 10 mg by mouth daily       hydrochlorothiazide (MICROZIDE) 12.5 MG capsule Take 12.5 mg by mouth every morning       ibuprofen (ADVIL;MOTRIN) 800 MG tablet Take 800 mg by mouth daily       traZODone (DESYREL) 150 MG tablet Take 150 mg by mouth nightly       verapamil (CALAN SR) 180 MG extended release tablet Take 180 mg by mouth nightly       methylPREDNISolone (MEDROL DOSEPACK) 4 MG tablet Take by mouth. 1 kit 0    benzonatate (TESSALON) 200 MG capsule Take 1 capsule by mouth 3 times daily as needed for Cough 30 capsule 0    promethazine-dextromethorphan (PROMETHAZINE-DM) 6.25-15 MG/5ML syrup Take 5 mLs by mouth nightly as needed for Cough 180 mL 0     No current facility-administered medications for this visit. No Known Allergies    Health Maintenance   Topic Date Due    HIV screen  11/30/1986    DTaP/Tdap/Td vaccine (1 - Tdap) 11/30/1990    Cervical cancer screen  11/30/1992    Lipid screen  11/30/2011    Flu vaccine (1) 09/01/2017       Subjective:      Review of Systems   Constitutional: Negative for chills, fatigue and fever. HENT: Positive for postnasal drip. Respiratory: Positive for cough and shortness of breath. Gastrointestinal: Negative for abdominal pain, diarrhea, nausea and vomiting. Skin: Negative for rash. Allergic/Immunologic: Positive for environmental allergies. All other systems reviewed and are negative. Objective:     Physical Exam   Constitutional: She is oriented to person, place, and time. She appears well-developed and well-nourished. No distress. HENT:   Head: Normocephalic and atraumatic. Right Ear: Hearing, tympanic membrane, external ear and ear canal normal.   Left Ear: Hearing, tympanic membrane, external ear and ear canal normal.   Nose: Nose normal.   Mouth/Throat: Uvula is midline, oropharynx is clear and moist and mucous membranes are normal.   Eyes: Pupils are equal, round, and reactive to light. Neck: Normal range of motion. Cardiovascular: Normal rate, regular rhythm and normal heart sounds. No murmur heard. Pulmonary/Chest: Effort normal and breath sounds normal. No respiratory distress. She has no decreased breath sounds. She has no wheezes.    Neurological: She [email or web address] and sign in to your [employer] account. Enter W362 in the [place] box to learn more about \"Chronic Cough: Care Instructions. \"     If you do not have an account, please click on the \"Sign Up Now\" link. Current as of: May 12, 2017  Content Version: 11.5  © 0902-5957 Healthwise, [employer]. Care instructions adapted under license by [facility] ([facility]). If you have questions about a medical condition or this instruction, always ask your healthcare professional. [place] [address] any warranty or liability for your use of this information.              Electronically signed by BOWEN Webster on 2/22/2018 at 5:13 PM

## 2018-02-27 ENCOUNTER — OFFICE VISIT (OUTPATIENT)
Dept: NEUROLOGY | Age: 48
End: 2018-02-27
Payer: COMMERCIAL

## 2018-02-27 VITALS
DIASTOLIC BLOOD PRESSURE: 109 MMHG | WEIGHT: 293 LBS | HEART RATE: 94 BPM | SYSTOLIC BLOOD PRESSURE: 148 MMHG | HEIGHT: 69 IN | BODY MASS INDEX: 43.4 KG/M2

## 2018-02-27 DIAGNOSIS — G47.33 SLEEP APNEA, OBSTRUCTIVE: Primary | ICD-10-CM

## 2018-02-27 DIAGNOSIS — F51.01 PRIMARY INSOMNIA: ICD-10-CM

## 2018-02-27 PROCEDURE — 99203 OFFICE O/P NEW LOW 30 MIN: CPT | Performed by: PSYCHIATRY & NEUROLOGY

## 2018-03-06 ASSESSMENT — ENCOUNTER SYMPTOMS
ABDOMINAL PAIN: 0
COUGH: 1
NAUSEA: 0
DIARRHEA: 0
BLURRED VISION: 0
DOUBLE VISION: 0
BACK PAIN: 1
CONSTIPATION: 0
VOMITING: 0
SHORTNESS OF BREATH: 1
BLOOD IN STOOL: 0
HEMOPTYSIS: 0
SPUTUM PRODUCTION: 0

## 2018-03-09 PROBLEM — I46.9 CARDIAC ARREST (HCC): Status: ACTIVE | Noted: 2018-01-01

## 2018-03-10 PROBLEM — R57.0 CARDIOGENIC SHOCK (HCC): Status: ACTIVE | Noted: 2018-01-01

## 2018-03-10 PROBLEM — E87.20 LACTIC ACIDOSIS: Status: ACTIVE | Noted: 2018-01-01

## 2018-03-10 PROBLEM — I10 HYPERTENSION: Status: ACTIVE | Noted: 2018-01-01

## 2018-03-10 PROBLEM — I51.7 CARDIOMEGALY: Status: ACTIVE | Noted: 2018-01-01

## 2018-03-10 PROBLEM — E87.20 METABOLIC ACIDOSIS: Status: ACTIVE | Noted: 2018-01-01

## 2018-03-10 PROBLEM — R77.8 ELEVATED TROPONIN: Status: ACTIVE | Noted: 2018-01-01

## 2018-03-10 NOTE — PROGRESS NOTES
"Pharmacy Dosing Service  Pharmacokinetics  Vancomycin Initial Evaluation    Assessment/Action/Plan:  Initiated Vancomycin 1500 mg IVPB every 24 hours.  Patient a code chill on concomitant Zosyn.  Pharmacy will monitor renal function and adjust dose accordingly.     Subjective:  Bryanna Telles is a 47 y.o. female with a Vancomycin \"Pharmacy to Dose\" consult for the treatment of Sepsis for 5 days .    Objective:  Ht: 162.6 cm (64\"); Wt: (!) 157 kg (346 lb 9.6 oz)  Estimated Creatinine Clearance: 76.9 mL/min (by C-G formula based on SCr of 1.38 mg/dL).   Lab Results   Component Value Date    CREATININE 1.38 03/10/2018    CREATININE 0.98 03/10/2018    CREATININE 1.14 03/09/2018      Lab Results   Component Value Date    WBC 15.78 (H) 03/10/2018    WBC 17.08 (H) 03/10/2018    WBC 14.99 (H) 03/10/2018      Baseline culture results:  Microbiology Results (last 10 days)       Procedure Component Value - Date/Time    Urine Culture - Urine, Urine, Clean Catch [[id number]]  (Normal) Collected:  03/09/18 0492    Lab Status:  Preliminary result Specimen:  Urine from Urine, Catheter Updated:  03/10/18 0659     Urine Culture No growth at 24 hours            Maxx Valencia, PharmD  03/10/18 10:35 AM    "

## 2018-03-10 NOTE — PROGRESS NOTES
PT TRANSPORTED TO CT BAGGED WITH 100% O2. ET TUBE PRIOR TO LEAVING AT 24, UPON ARRIVAL TO CT AT 24, AND RETURNED TO ER OOM #5 STILL AT 24.  TUBE PLACEMENT VERIFIED BY RT, CATARINA GRANT AND RN, RA ALCANTAR PRIOR, UPON ARRIVAL AND RETURN FROM CT. ETCO2 STABLE AROUND 32-35 AND PULSE OX STABLE AROUND 100%.  PT RETURNED TO ER ROOM#5 AND PLACED ON SAME VENT SETTINGS.  NO COMPLICATIONS.

## 2018-03-10 NOTE — ED PROVIDER NOTES
"Subjective   HPI Comments: 48 y/o female arrives via ems for cardiac arrest. Patient arrives with GCS of 3 T unresponsive with fixed and dilated pupils. All history from EMS and family. Per friend/family patient was boweling and collapsed suddenly stating she did not feel well. Per EMS down time >25 minutes but with ROSC actually on arrival here after two rounds of epi, one shock and narcan. Per family, patient has been complaining of a cough and not feeling well. Was apparently told to \"double her sleeping medication\" but family unsure of what that is. Family does note history of brain aneurysms in the past and seizures as well but friend (who also has seizures) denies that patient had a seizure. Patient arrives as above    Patient is a 47 y.o. female presenting with cardiac arrest.   History provided by:  EMS personnel and relative  Cardiac Arrest       Review of Systems   Unable to perform ROS: Intubated       Past Medical History:   Diagnosis Date   • Hypertension    • Sleep apnea        No Known Allergies    Past Surgical History:   Procedure Laterality Date   • HYSTERECTOMY         History reviewed. No pertinent family history.    Social History     Social History   • Marital status: Single     Social History Main Topics   • Smoking status: Never Smoker   • Alcohol use Defer   • Drug use: Defer           Objective   Physical Exam   Constitutional: She appears well-developed and well-nourished.   HENT:   Head: Normocephalic and atraumatic.   Right Ear: External ear normal.   Left Ear: External ear normal.   Mouth/Throat: Oropharynx is clear and moist.   Eyes:   Fixed dilated pupils   Neck: Normal range of motion. Neck supple.   Cardiovascular: Regular rhythm, normal heart sounds and intact distal pulses.    tachycardia   Pulmonary/Chest: Breath sounds normal.   No spontanous breathing, breathsounds bilterally with bagging.    Abdominal: Soft. Bowel sounds are normal.   ? Left sided hernia vs lipoma felt in the " upper abdomen.    Musculoskeletal: She exhibits no edema or tenderness.   Neurological:   gcs 3 T    Has no corneal, no gag no reflexes, no clonus.    Skin: Skin is warm.   Vitals reviewed.      Central Line At Bedside  Date/Time: 3/9/2018 11:23 PM  Performed by: KENNA HELTON  Authorized by: KENNA HELTON     Consent:     Consent obtained:  Emergent situation  Pre-procedure details:     Hand hygiene: Hand hygiene performed prior to insertion      Sterile barrier technique: All elements of maximal sterile technique followed      Skin preparation:  2% chlorhexidine    Skin preparation agent: Skin preparation agent completely dried prior to procedure    Anesthesia (see MAR for exact dosages):     Anesthesia method:  Local infiltration    Local anesthetic:  Lidocaine 1% w/o epi  Procedure details:     Location:  R femoral    Patient position:  Flat    Procedural supplies:  Triple lumen    Catheter size:  7 Fr    Landmarks identified: yes      Ultrasound guidance: no      Number of attempts:  1    Successful placement: yes    Post-procedure details:     Post-procedure:  Dressing applied    Assessment:  Blood return through all ports and free fluid flow    Patient tolerance of procedure:  Tolerated well, no immediate complications  Critical Care  Performed by: KENNA HELTON  Authorized by: KENNA HELTON     Critical care provider statement:     Critical care time (minutes):  30    Critical care start time:  3/9/2018 8:35 PM    Critical care end time:  3/9/2018 11:24 PM    Critical care time was exclusive of:  Separately billable procedures and treating other patients    Critical care was necessary to treat or prevent imminent or life-threatening deterioration of the following conditions:  Respiratory failure    Critical care was time spent personally by me on the following activities:  Blood draw for specimens, development of treatment plan with patient or surrogate, discussions with primary  provider, evaluation of patient's response to treatment, examination of patient, obtaining history from patient or surrogate, review of old charts, re-evaluation of patient's condition, ordering and review of radiographic studies, ordering and review of laboratory studies and ordering and performing treatments and interventions    I assumed direction of critical care for this patient from another provider in my specialty: no    ECG 12 Lead    Date/Time: 3/9/2018 8:35 PM  Performed by: LEONARDO HELTON  Authorized by: INES VELASQUEZ   Interpreted by physician  Rhythm: sinus tachycardia  Rate: tachycardic  BPM: 131  QRS axis: normal  Comments: St-t depression               ED Course  ED Course   Comment By Time   Cold chill started when ct head read as negative without edema. Leonardo Helton MD 03/09 2205      CT Abdomen Pelvis With Contrast   Final Result   1. Patchy bilateral airspace disease within the lower lobes. This is in   part related atelectasis but some superimposed pulmonary edema is also a   differential. There is some free fluid within the pelvis as well as some   stranding within the subcutaneous tissue suggesting that there may be   some third spacing of fluid within the subcutaneous tissues suggesting   developing anasarca.   2. The soft tissue organs are unremarkable. The abdominal aorta is   normal in caliber with no evidence of dissection or aneurysm..        3. Moderate cardiomegaly. No evidence of pericardial effusion.       This report was finalized on 03/09/2018 22:35 by Dr. Pravin Jean-Baptiste MD.      CT Chest With Contrast   Final Result   1. Bilateral airspace disease some of which is related to a poor   inspiratory effort. The patient is intubated. Upper lobe and perihilar   airspace disease could represent some early developing pulmonary edema.   No effusions are present. There is moderate cardiomegaly.   2. The thoracic aorta is normal in caliber with no definite dissection    or aneurysm. The proximal great vessels are unremarkable..           This report was finalized on 03/09/2018 22:28 by Dr. Pravin Jean-Baptiste MD.      CT Cervical Spine Without Contrast   Final Result   1. Arthritic changes. No evidence of acute osseous injury or   malalignment. The study is degraded by motion.   2. Groundglass disease within the upper lung apices suspicious for   interstitial pulmonary edema.           This report was finalized on 03/09/2018 21:56 by Dr. Pravin Jean-Baptiste MD.      XR Chest 1 View   Final Result   1.. Marked cardiomegaly with pulmonary vascular congestion.   2. Endotracheal tube well positioned.   This report was finalized on 03/09/2018 21:53 by Dr. Pravin Jean-Baptiste MD.      CT Head Without Contrast   Final Result   1.. Limited exam secondary to extensive motion artifact even after   repeating the sequence multiple times.   2. Grossly unremarkable nonenhanced CT of the brain.   This report was finalized on 03/09/2018 21:51 by Dr. Pravin Jean-Baptiste MD.        Labs Reviewed   COMPREHENSIVE METABOLIC PANEL - Abnormal; Notable for the following:        Result Value    Glucose 180 (*)     Potassium 3.1 (*)     AST (SGOT) 53 (*)     Anion Gap 16.0 (*)     All other components within normal limits   BNP (IN-HOUSE) - Abnormal; Notable for the following:     proBNP 4230.0 (*)     All other components within normal limits   TROPONIN (IN-HOUSE) - Abnormal; Notable for the following:     Troponin I 0.059 (*)     All other components within normal limits   CBC WITH AUTO DIFFERENTIAL - Abnormal; Notable for the following:     WBC 20.13 (*)     MCH 27.3 (*)     MCHC 30.8 (*)     Neutrophil % 84.2 (*)     Lymphocyte % 10.2 (*)     Monocyte % 3.4 (*)     Neutrophils, Absolute 16.94 (*)     Immature Grans, Absolute 0.26 (*)     All other components within normal limits   BLOOD GAS, ARTERIAL - Abnormal; Notable for the following:     pH, Arterial 7.271 (*)     pCO2, Arterial 48.4 (*)     pO2,  Arterial 118.0 (*)     Base Excess, Arterial -4.9 (*)     All other components within normal limits   LACTIC ACID, PLASMA - Abnormal; Notable for the following:     Lactate 3.4 (*)     All other components within normal limits   PHOSPHORUS - Abnormal; Notable for the following:     Phosphorus 4.7 (*)     All other components within normal limits   CALCIUM, IONIZED - Abnormal; Notable for the following:     Ionized Calcium 4.46 (*)     All other components within normal limits   PROTIME-INR - Normal   APTT - Normal   CK - Normal   ETHANOL - Normal    Narrative:     Not for legal purposes. Chain of Custody not followed.    MAGNESIUM - Normal   PROCALCITONIN - Normal    Narrative:     SIRS, sepsis, severe sepsis, and septic shock are categorized according to the criteria of the consensus conference of the American College of Chest Physicians/Society of Critical Care Medicine.    PCT < 0.5 ng/mL     Systemic infection (sepsis) is not likely.    PCT >0.5 and < 2.0 ng/mL Systemic infection (sepsis) is possible, but other conditions are known to elevate PCT as well.    PCT > 2.0 ng/mL     Systemic infection (sepsis) is likely, unless other causes are known.      PCT > 10.0 ng/mL    Important systemic inflammatory response, almost exclusively due to severe bacterial sepsis or septic shock.    PCT values of < 0.5 ng/mL do not exclude an infection, because localized infections (without systemic signs) may be associated with such low concentrations, or a systemic infection in its initial stages (<6 hours).  Increased PCT can occur without infection.  PCT concentrations between 0.5 and 2.0 ng/mL should be interpreted taking into account the patients history.  It is recommended to retest PCT within 6-24 hours if any concentrations < 2.0 ng/mL are obtained.   TSH - Normal   T4, FREE - Normal   CK TOTAL AND CKMB - Normal    Narrative:     CKMB Index not indicated   HCG, SERUM, QUALITATIVE - Normal   URINE CULTURE   RAINBOW DRAW     Narrative:     The following orders were created for panel order [place] Draw.  Procedure                               Abnormality         Status                     ---------                               -----------         ------                     Light Blue Top[[id number]]                                    Final result               Green Top (Gel)[[id number]]                                   Final result               Lavender Top[[id number]]                                      Final result               Red Top[[id number]]                                           Final result                 Please view results for these tests on the individual orders.   BLOOD GAS, ARTERIAL   PREGNANCY, URINE   URINALYSIS W/ CULTURE IF INDICATED   URINE DRUG SCREEN   LACTIC ACID, PLASMA   CALCIUM, IONIZED   CALCIUM, IONIZED   PHOSPHORUS   URINALYSIS, MICROSCOPIC ONLY   LACTIC ACID REFLEX TIMER   POCT GLUCOSE FINGERSTICK   POCT GLUCOSE FINGERSTICK   POCT GLUCOSE FINGERSTICK   POCT GLUCOSE FINGERSTICK   POCT GLUCOSE FINGERSTICK   POCT GLUCOSE FINGERSTICK   POCT GLUCOSE FINGERSTICK   POCT GLUCOSE FINGERSTICK   POCT GLUCOSE FINGERSTICK   POCT GLUCOSE FINGERSTICK   POCT GLUCOSE FINGERSTICK   POCT GLUCOSE FINGERSTICK   POCT GLUCOSE FINGERSTICK   POCT GLUCOSE FINGERSTICK   TYPE AND SCREEN   LIGHT BLUE TOP   GREEN TOP   LAVENDER TOP   RED TOP   CBC AND DIFFERENTIAL    Narrative:     The following orders were created for panel order CBC & Differential.  Procedure                               Abnormality         Status                     ---------                               -----------         ------                     CBC Auto Differential[[id number]]        Abnormal            Final result                 Please view results for these tests on the individual orders.     Patient is now biting on tube but she is still without gag or corneal reflexes, she has no purposeful movements at this time. Given CT findings of head that were  without edema or blood I did order the cooling protocol. Of note, patient with CHF and pulmonary edema, lasix was given, given fever here abx were given as well. I attempted to do a bedside US but given body habitus I could not see anything. I do ? Etiology of symptoms and question if this if cardiogenic in nature given chf and slightly elevated trop. I did discuss with hospitalist if he would like me to call cards given abnormal ekg and cardiac arrest vs IV nitro for HTN. States he will write orders and does not wish for stat cardiology call at this time. Family aware of all findings and very ill patient and need for cooling. They are aware that even with quick CPR there is a risk of damage to brain and that cooling is best option to perseve function at this time.              MDM    Final diagnoses:   Cardiac arrest   Acute on chronic systolic congestive heart failure   Fever in adult            Leonardo Arreguin MD  03/09/18 0007

## 2018-03-10 NOTE — ANESTHESIA PROCEDURE NOTES
Arterial Line    Patient location during procedure: ICU  Start time: 3/10/2018 1:05 AM  Stop Time:3/10/2018 1:08 AM       Line placed for hemodynamic monitoring.  Preanesthetic Checklist  Completed: patient identified, site marked, surgical consent, pre-op evaluation, timeout performed, IV checked, risks and benefits discussed and monitors and equipment checked  Arterial Line Prep   Sterile Tech: cap, gloves and sterile barriers  Prep: ChloraPrep  Patient monitoring: EKG, continuous pulse oximetry and blood pressure monitoring  Arterial Line Procedure   Laterality:left  Location:  radial artery  Catheter size: 20 G   Guidance: ultrasound guided  Number of attempts: 1  Successful placement: yes          Post Assessment   Dressing Type: wrist guard applied, secured with tape and occlusive dressing applied.   Complications no  Circ/Move/Sens Assessment: normal and unchanged.   Patient Tolerance: patient tolerated the procedure well with no apparent complications

## 2018-03-10 NOTE — CONSULTS
"    PULMONARY AND CRITICAL CARE CONSULT - [facility]    Bryanna Telles   MR# [id number]  Acct# [id number]  3/10/2018   10:34 AM    Referring Provider: Thierno Winchester DO    Chief Complaint: Mechanically ventilated    HPI: We are consulted by Thierno Winchester DO to see this 47 y.o. year old female born on 1970.  Patient was reportedly at the [facility] and had cardiac arrest.  She received CPR by bystanders and transferred here,  requiring intubation.  She had another code event early this morning with PEA.  Hypothermia protocol is in place.  Respiratory history: sleep apnea, probable obesity hypoventilation syndrome.  RN reports that she had fever on arrival and her daughter reported that she had not been feeling well \"for a while\".  Chest x-ray shows cardiomegaly with some probable mild pulmonary edema.  She was initially requiring 2 pressors but is now just on Levophed.  Additionally, she is on, propofol, bicarbonate drip and fentanyl.  Plan of care has been discussed with Dr. Winchester.  An echocardiogram is being done at bedside now and unofficial report of EF is around 20%.    Past Medical History  Past Medical History:   Diagnosis Date   • Hypertension    • Sleep apnea      Past Surgical History:   Procedure Laterality Date   • HYSTERECTOMY       No Known Allergies  Medications    artificial tears  Both Eyes Q2H   enoxaparin 40 mg Subcutaneous Q24H   furosemide 60 mg Intravenous Once   hydrALAZINE 20 mg Intravenous Once   pantoprazole 40 mg Intravenous Q AM   piperacillin-tazobactam 4.5 g Intravenous Once   piperacillin-tazobactam 4.5 g Intravenous Q8H   IVPB builder  Intravenous Q24H       DOPamine 2-20 mcg/kg/min    EPINEPHrine 0.02-0.3 mcg/kg/min    fentanyl 10 mcg/mL  mcg/hr Last Rate: 100 mcg/hr (03/10/18 0932)   insulin regular infusion 1 unit/mL 1-20 Units/hr    norepinephrine 0.02-0.3 mcg/kg/min Last Rate: 0.2 mcg/kg/min (03/10/18 0950)   Pharmacy to dose vancomycin   "   phenylephrine 0.5-3 mcg/kg/min Last Rate: Stopped (03/10/18 0945)   propofol 5-50 mcg/kg/min Last Rate: 50 mcg/kg/min (03/10/18 0945)   propofol 5-50 mcg/kg/min Last Rate: 30 mcg/kg/min (03/10/18 0931)   sodium bicarbonate drip (greater than 75 mEq/bag) 75 mL/hr Last Rate: 75 mL/hr (03/10/18 1007)   sodium chloride  Last Rate: 999 mL/hr (03/10/18 0752)   vecuronium (NORCURON) infusion 1 mcg/kg/min      Social History   reports that she has never smoked. She does not have any smokeless tobacco history on file. Alcohol use questions deferred to the physician. Drug use questions deferred to the physician.    Family History  family history is not on file.    Review of Systems:  Cannot obtain due to mechanical ventilation.  The patient notably is critically ill and connected to a ventilator.  As such patient cannot communicate and provide any history whatsoever, including any history of present illness or interval history since arrival or review of systems. The interested reviewer may note this fact, as an attempt has been made at collecting and documenting these portions of the patient history, but this information is unobtainable despite attempted review and therefore cannot be documented at this time.     Physical Exam:  Temp:  [97.2 °F (36.2 °C)-100.8 °F (38.2 °C)] 100.8 °F (38.2 °C)  Heart Rate:  [0-135] 100  Resp:  [0-32] 16  BP: ()/() 129/104  Arterial Line BP: ()/(30-87) 86/71  FiO2 (%):  [40 %-100 %] 100 %  Intake/Output Summary (Last 24 hours) at 03/10/18 1034  Last data filed at 03/10/18 0330   Gross per 24 hour   Intake                0 ml   Output              350 ml   Net             -350 ml     1    03/10/18  0002 03/10/18  0043   Weight: (!) 157 kg (346 lb 9.6 oz) (!) 157 kg (346 lb 9.6 oz)     Physical Exam   Constitutional: She appears well-developed and well-nourished. She is sedated and intubated.   obese   HENT:   Head: Normocephalic and atraumatic.   Nose: Rhinorrhea present.    Eyes: No scleral icterus.   Pupils fixed and dilated    Neck: Neck supple.   Neck is thick   Cardiovascular: Tachycardia present.    PVCs noted   Pulmonary/Chest: She is intubated. She has rhonchi (Course sounding).   Abdominal: Soft. She exhibits no distension.   Obese   Musculoskeletal: She exhibits edema (Bilateral lower extremities).   Neurological: She is unresponsive.   Skin: Skin is dry.   Cool, hypothermia protocol in progress     Ventilator Settings:  Vent Mode: VC+/AC  Vt (Set, L): 0.5 L  Resp Rate (Set): 22 (see current abg results)     FiO2 (%): 100 %  PEEP/CPAP (cm H2O): 5 cm H20  Minute Ventilation (L/min) (Obs): 7.36 L/min  Resp Rate (Observed) Vent: 14     I:E Ratio (Obs): 1:1.2  PIP Observed (cm H2O): 38 cm H2O          Results from last 7 days  Lab Units 03/10/18  0700 03/10/18  0609 03/10/18  0150   WBC 10*3/mm3 15.78* 17.08* 14.99*   HEMOGLOBIN g/dL 13.2 13.0 12.4   PLATELETS 10*3/mm3 265 250 286       Results from last 7 days  Lab Units 03/10/18  0744 03/10/18  0610 03/10/18  0343 03/09/18  2257 03/09/18  2146   SODIUM mmol/L 147*  --  145  --  142   SODIUM, ARTERIAL mmol/L  --  147*  --   --   --    POTASSIUM mmol/L 3.3*  --  3.3*  --  3.1*   CO2 mmol/L 18.0*  --  24.0  --  25.0   BUN mg/dL 20  --  19  --  18   CREATININE mg/dL 1.38  --  0.98  --  1.14   MAGNESIUM mg/dL 2.9*  --  2.3*  --  2.1   PHOSPHORUS mg/dL 7.7*  --  5.4* 4.7*  --    GLUCOSE mg/dL 174*  --  188*  --  180*       Results from last 7 days  Lab Units 03/10/18  0830 03/10/18  0610 03/10/18  0242   PH, ARTERIAL pH units 7.082* 7.041* 7.293*   PCO2, ARTERIAL mm Hg 63.5* 63.2* 47.8*   PO2 ART mm Hg 147.0* 67.0* 201.0*   FIO2 % 100 40 60     Urine Culture   Date Value Ref Range Status   03/09/2018 No growth at 24 hours  Preliminary     Lab Results   Component Value Date    PROBNP 4,230.0 (H) 03/09/2018     Recent radiology:   Imaging Results (last 72 hours)     Procedure Component Value Units Date/Time    XR Chest 1 View  [[id number]] Collected:  03/10/18 0714     Updated:  03/10/18 0719    Narrative:       EXAMINATION:   XR CHEST 1 VW-  3/10/2018 7:14 AM CST     HISTORY: Baseline     Frontal upright radiograph of the chest 3/10/2018 3:15 AM CST     COMPARISON: March 9, 2018.     FINDINGS:   Right perihilar infiltrates present. There is atelectasis left lower  lobe.. Cardiac silhouettes enlarged and the findings are most consistent  with pulmonary vascular congestion. Endotracheal tube is present..      The osseous structures and surrounding soft tissues demonstrate no acute  abnormality.       Impression:       1. Cardiomegaly bilateral infiltrates most likely representing pulmonary  vascular congestion.  2. Atelectasis left lower lobe is now present..        This report was finalized on 03/10/2018 07:16 by Dr. Freedom Vásquez MD.    CT Abdomen Pelvis With Contrast [[id number]] Collected:  03/09/18 2229     Updated:  03/09/18 2238    Narrative:       CT ABDOMEN PELVIS W CONTRAST- 3/9/2018 9:11 PM CST     HISTORY: cardiac arrest       COMPARISON: None.      DLP: 2204 mGy cm. Automated exposure control was utilized to diminish  patient radiation dose.     TECHNIQUE: Following the intravenous administration of contrast, helical  CT tomographic images of the abdomen and pelvis were acquired. Coronal  reformatted images were also provided for review.      FINDINGS:   Bilateral lower lobe airspace disease is present for which I would favor  atelectasis. Early pulmonary edema may also contribute to this finding.  There is moderate cardiomegaly. No evidence of pericardial effusion..      LIVER: No focal liver lesion. The hepatic vasculature is patent.      BILIARY SYSTEM: The gallbladder is unremarkable. No intrahepatic or  extrahepatic ductal dilatation.      PANCREAS: No focal pancreatic lesion.      SPLEEN: Unremarkable.      KIDNEYS AND ADRENALS: Bilateral kidneys and adrenal glands are  unremarkable. The ureters are decompressed and normal  in appearance.     RETROPERITONEUM: No mass, lymphadenopathy or hemorrhage.      GI TRACT: No evidence of obstruction or bowel wall thickening. The  appendix is visualized and unremarkable.     OTHER: There is no mesenteric mass, lymphadenopathy or fluid collection.  The abdominopelvic vasculature is patent. The osseous structures and  soft tissues demonstrate no worrisome lesions. There is a small amount  of free fluid within the pelvis. There is some stranding within the  subcutaneous tissue suggests there may be some third spacing of fluid.  The patient is facet status post hysterectomy.      PELVIS: No evidence of mass. The patient is status post hysterectomy..  The urinary bladder is normal in appearance.       Impression:       1. Patchy bilateral airspace disease within the lower lobes. This is in  part related atelectasis but some superimposed pulmonary edema is also a  differential. There is some free fluid within the pelvis as well as some  stranding within the subcutaneous tissue suggesting that there may be  some third spacing of fluid within the subcutaneous tissues suggesting  developing anasarca.  2. The soft tissue organs are unremarkable. The abdominal aorta is  normal in caliber with no evidence of dissection or aneurysm..      3. Moderate cardiomegaly. No evidence of pericardial effusion.     This report was finalized on 03/09/2018 22:35 by Dr. Pravin Jean-Baptiste MD.    CT Chest With Contrast [[id number]] Collected:  03/09/18 2223     Updated:  03/09/18 2232    Narrative:       CT CHEST W CONTRAST- 3/9/2018 9:11 PM CST     HISTORY: cardiac arrest      COMPARISON: None      DLP: 554 mGy cm. Automated exposure control was utilized to diminish  patient radiation dose.     TECHNIQUE: Serial helical tomographic images of the chest were acquired  following the infusion of Isovue contrast intravenously. Bone and soft  tissue algorithms were provided.       FINDINGS:   Neck base: The imaged portion of the  neck and thyroid gland is  unremarkable. The patient is intubated with endotracheal tube  well-positioned.     Lungs: There is patchy bilateral airspace disease. The disease within  the lower lobes I suspect represents some atelectasis related to a poor  inspiratory effort. However, the disease within the perihilar aspect of  both lungs and both upper lobes could be related to atelectasis or some  early developing pulmonary edema. Aspiration pneumonia would also be a  differential.. No pleural effusion is present. The trachea and bronchial  tree are patent.      Heart: There is moderate cardiomegaly.. There is no pericardial  effusion.      Vasculature: Aorta is normal in caliber and appearance without  significant atherosclerosis, stenosis, or aneurysm. No coronary  arteriosclerosis identified. The great vessels are normal in appearance.  The pulmonary arteries are normal in appearance.     Lymph nodes: No enlarged axillary, hilar, or mediastinal lymph nodes.      Bones and soft tissues: No acute osseous or soft tissue abnormality is  seen. There are no worrisome osseous lesions.      Upper abdomen: The imaged portion of the upper abdomen is unremarkable.        Impression:       1. Bilateral airspace disease some of which is related to a poor  inspiratory effort. The patient is intubated. Upper lobe and perihilar  airspace disease could represent some early developing pulmonary edema.  No effusions are present. There is moderate cardiomegaly.  2. The thoracic aorta is normal in caliber with no definite dissection  or aneurysm. The proximal great vessels are unremarkable..        This report was finalized on 03/09/2018 22:28 by Dr. Pravin Jean-Baptiste MD.    CT Cervical Spine Without Contrast [[id number]] Collected:  03/09/18 2153     Updated:  03/09/18 2159    Narrative:       CT CERVICAL SPINE WO CONTRAST- 3/9/2018 8:53 PM CST     HISTORY: cardiac arrest     COMPARISON: None      DOSE LENGTH PRODUCT: 364 mGy cm.  Automated exposure control was utilized  to diminish patient radiation dose.     TECHNIQUE: Serial helical tomographic images of the cervical spine were  obtained without the use of intravenous contrast. Additionally, sagittal  and coronal reformatted images were also provided for review. The study  is markedly degraded by motion artifact as well as beam hardening.     FINDINGS:   Alignment: Normal.     Bones: There is no evidence of fracture. Vertebral body heights are  maintained.     Disc spaces: Degenerative disc disease is noted at C4-C5, C5-C6 and  C6-C7 with narrowing of the space height and spondylosis.     Canal and neuroforamina: Bilateral neural foraminal narrowing is noted  at C5-C6 related uncinate spurring.     Soft tissues: Unremarkable.     Lung apices: Patchy groundglass disease is noted within the upper lobes  suggesting interstitial edema.       Impression:       1. Arthritic changes. No evidence of acute osseous injury or  malalignment. The study is degraded by motion.  2. Groundglass disease within the upper lung apices suspicious for  interstitial pulmonary edema.        This report was finalized on 03/09/2018 21:56 by Dr. Pravin Jean-Baptiste MD.    XR Chest 1 View [[id number]] Collected:  03/09/18 2152     Updated:  03/09/18 2156    Narrative:       EXAMINATION: Chest one view 3/9/2018     HISTORY: Postintubation     FINDINGS: Today's exam is compared to previous study of 12/4/2017. The  patient has been intubated with endotracheal tube tip well-positioned.  There is cardiomegaly with pulmonary vascular congestion suggesting  developing pulmonary venous hypertension and early pulmonary edema.       Impression:       1.. Marked cardiomegaly with pulmonary vascular congestion.  2. Endotracheal tube well positioned.  This report was finalized on 03/09/2018 21:53 by Dr. Pravin Jean-Baptiste MD.    CT Head Without Contrast [[id number]] Collected:  03/09/18 2147     Updated:  03/09/18 2154    Narrative:        EXAMINATION: CT head without contrast 3/9/2018     HISTORY: Cardiac arrest.     DOSE: 1079 gray centimeter. Automated exposure control was utilized to  diminish patient radiation dose..     FINDINGS: Multiple contiguous axial images are obtained from the skull  base to the vertex per protocol without intravenous contrast-enhancement  with reformatted images obtained in the sagittal and coronal  projections. The study is limited as the patient was unable to cooperate  with the exam. We attempted scanning the patient several times. I do not  see gross evidence of mass effect or shift of the midline. No gross  evidence of hemorrhage. No shift of the midline structures. No acute  calvarial abnormalities are present. The orbits are grossly  unremarkable. The visualized paranasal sinuses and mastoid air cells are  clear.       Impression:       1.. Limited exam secondary to extensive motion artifact even after  repeating the sequence multiple times.  2. Grossly unremarkable nonenhanced CT of the brain.  This report was finalized on 03/09/2018 21:51 by Dr. Pravin Jean-Baptiste MD.        My radiograph interpretation/independent review of imaging: Cardiomegaly, ET tube in place some probable mild pulmonary edema  Other test results (not lab or imaging): Echocardiogram pending  Independent review of ekg: Sinus tachycardia, rate 130s, ST abnormality, to   Patient Active Problem List   Diagnosis   • Cardiac arrest   • Hypertension   • Metabolic acidosis   • Elevated troponin   • Lactic acidosis   • Cardiomegaly   • Cardiogenic shock     Pulmonary Assessment:  SEVERE ACUTE RESPIRATORY FAILURE REQUIRING MECHANICAL VENTILATION  This is a threat to life or pulmonary function, high risk, due to cardiac arrest with probable cardiogenic shock    New problem (to me), with additional workup planned: None    New problem (to me), no additional workup planned: Morbid obesity with a BMI greater than 59    Other problems either  stable, failing to improve or worsenin. Lactic acidosis  2. Metabolic acidosis, on bicarbonate drip per attending  3. Leukocytosis  4. Cardiomegaly  5. Elevated troponin, post code  6. Hypotension, improving with pressors  7. History of hypertension    Recommend/plan:   · Ventilator settings: Assist control, rate 22, tidal volume 500,  P5, FiO2 50%  · Repeat ABGs  · Blood cultures pending  · Obtain flu swab  · Echo pending  · Sputum culture and Gram stain ordered  · Start neb treatments  · Chest X-ray in the morning and daily while on the vent  · Arterial blood gas analysis in the morning tomorrow and daily while on the vent  · Attending will get neuro consult once hypothermic protocol is complete  · O2 sats on monitor are not being picked up properly.  Sat on monitor is showing 60% with a normal appearing waveform however current PO2 is 302.  Should there be concerns of hypoxia overnight please obtain ABG prior to calling for further orders.    Electronically signed by SB Benítez on 3/10/2018 at 10:34 AM    Physician substantive contribution:  Pertinent symptoms/interval history include: The patient is undergoing hypothermia protocol.  Respiratory exam shows pertinent findings of fair air movement on chest exam.  Plan includes: We will continue ventilatory support.  I have seen and examined patient personally, performing a face-to-face diagnostic evaluation with plan of care reviewed and developed with APRN and nursing staff. I have addended and/or modified the above history of present illness, physical examination, and assessment and plan to reflect my findings and impressions. Essential elements of the care plan were discussed with APRN above.  Agree with findings and assessment/plan as documented above.    Electronically signed by Barak Ramirez MD, on 3/10/2018, 2:26 PM

## 2018-03-10 NOTE — PROGRESS NOTES
[facility]  INPATIENT PROGRESS NOTE    Length of Stay: 1  Date of Admission: 3/9/2018  Primary Care Physician: SB Michel    Subjective     Chief Complaint:     Status post cardiac arrest    HPI     Three pressors were earlier required to maintain adequate blood pressure.  She is intubated and sedated with propofol.  Dopamine has been successfully weaned and she remains on Pj and Levophed.  Code chill in progress and have reached goal temperature.  Acidosis persists.  Lactic acid increased from 1.2 to 5.2 to 7.8 on the latest lab.  Discussion was held with the patient and daughter and sister.  I explained that at this point her condition appears to be cardiogenic in origin but that further studies will be required to confirm.  The patient is more acidotic this morning than at the time of admission.  Bolus of bicarbonate will be given and a bicarbonate drip will be started.  Antibiotic spectrum will be broadened.  Pulmonology has been consulted as well.  Stat echocardiogram is been ordered.    Review of Systems     All pertinent negatives and positives are as above. All other systems have been reviewed and are negative unless otherwise stated.     Objective    Temp:  [97.2 °F (36.2 °C)-100.8 °F (38.2 °C)] 100.8 °F (38.2 °C)  Heart Rate:  [0-135] 100  Resp:  [0-32] 16  BP: ()/() 129/104  Arterial Line BP: ()/(30-87) 86/71  FiO2 (%):  [40 %-100 %] 100 %    Lab Results (last 24 hours)     Procedure Component Value Units Date/Time    Blood Gas, Arterial [[id number]]  (Abnormal) Collected:  03/10/18 0830    Specimen:  Arterial Blood Updated:  03/10/18 0834     Site Arterial Line     Boo's Test N/A     pH, Arterial 7.082 (C) pH units      pCO2, Arterial 63.5 (H) mm Hg      pO2, Arterial 147.0 (H) mm Hg      HCO3, Arterial 18.9 (L) mmol/L      Base Excess, Arterial -11.9 (L) mmol/L      O2 Saturation, Arterial 98.1 %      Temperature  37.0 C      Barometric Pressure for Blood Gas 749 mmHg      Modality Ventilator     FIO2 100 %      Ventilator Mode AC     Set Tidal Volume 500     Set [facility] Resp Rate 14.0     PEEP 5.0     Notified [facility] [id number]     Notified By [id number]     Notified Time 03/10/2018 08:34     Collected by [id number]    CBC Auto Differential [[id number]]  (Abnormal) Collected:  03/10/18 0700    Specimen:  Blood Updated:  03/10/18 0823     WBC 15.78 (H) 10*3/mm3      RBC 4.93 10*6/mm3      Hemoglobin 13.2 g/dL      Hematocrit 45.2 %      MCV 91.7 fL      MCH 26.8 (L) pg      MCHC 29.2 (L) g/dL      RDW 14.7 %      RDW-SD 49.5 fl      MPV 9.9 fL      Platelets 265 10*3/mm3      Neutrophil % 77.7 %      Lymphocyte % 13.8 (L) %      Monocyte % 5.9 %      Eosinophil % 0.1 %      Basophil % 0.4 %      Immature Grans % 2.1 %      Neutrophils, Absolute 12.26 (H) 10*3/mm3      Lymphocytes, Absolute 2.18 10*3/mm3      Monocytes, Absolute 0.93 10*3/mm3      Eosinophils, Absolute 0.02 10*3/mm3      Basophils, Absolute 0.06 10*3/mm3      Immature Grans, Absolute 0.33 (H) 10*3/mm3      nRBC 0.0 /100 WBC     aPTT [[id number]]  (Normal) Collected:  03/10/18 0744    Specimen:  Blood Updated:  03/10/18 0817     PTT 29.2 seconds     Lactic Acid, Plasma [[id number]]  (Abnormal) Collected:  03/10/18 0743    Specimen:  Blood Updated:  03/10/18 0807     Lactate 7.8 (C) mmol/L     Magnesium [[id number]]  (Abnormal) Collected:  03/10/18 0744    Specimen:  Blood Updated:  03/10/18 0805     Magnesium 2.9 (H) mg/dL     Phosphorus [[id number]]  (Abnormal) Collected:  03/10/18 0744    Specimen:  Blood Updated:  03/10/18 0804     Phosphorus 7.7 (H) mg/dL     Comprehensive Metabolic Panel [[id number]]  (Abnormal) Collected:  03/10/18 0744    Specimen:  Blood Updated:  03/10/18 0804     Glucose 174 (H) mg/dL      BUN 20 mg/dL      Creatinine 1.38 mg/dL      Sodium 147 (H) mmol/L      Potassium 3.3 (L) mmol/L      Chloride 106 mmol/L      CO2 18.0 (L) mmol/L      Calcium 7.1 (L)  mg/dL      Total Protein 6.7 g/dL      Albumin 3.40 (L) g/dL      ALT (SGPT) 74 (H) U/L      AST (SGOT) 96 (H) U/L      Alkaline Phosphatase 73 U/L      Total Bilirubin 1.2 (H) mg/dL      eGFR  African Amer 50 (L) mL/min/1.73      Globulin 3.3 gm/dL      A/G Ratio 1.0 (L) g/dL      BUN/Creatinine Ratio 14.5     Anion Gap 23.0 (H) mmol/L     Urine Culture - Urine, Urine, Clean Catch [[id number]]  (Normal) Collected:  03/09/18 2237    Specimen:  Urine from Urine, Catheter Updated:  03/10/18 0659     Urine Culture No growth at 24 hours    Calcium, Ionized [[id number]]  (Abnormal) Collected:  03/10/18 0610    Specimen:  Blood Updated:  03/10/18 0622     Ionized Calcium 3.93 (L) mg/dL      Collected by [id number]    Blood Gas, Arterial With Co-Ox [[id number]]  (Abnormal) Collected:  03/10/18 0610    Specimen:  Arterial Blood Updated:  03/10/18 0621     Site Arterial Line     Boo's Test N/A     pH, Arterial 7.041 (C) pH units      pCO2, Arterial 63.2 (H) mm Hg      pO2, Arterial 67.0 (L) mm Hg      HCO3, Arterial 17.1 (L) mmol/L      Base Excess, Arterial -14.1 (L) mmol/L      O2 Saturation, Arterial 82.0 (L) %      Hemoglobin, Blood Gas 13.2 (L) g/dL      Hematocrit, Blood Gas 40.5 %      Oxyhemoglobin 80.3 (L) %      Methemoglobin 1.40 %      Carboxyhemoglobin 0.8 %      Temperature 36.0 C      Sodium, Arterial 147 (H) mmol/L      Potassium, Arterial 4.5 mmol/L      Ionized Calcium 3.93 (L) mg/dL      Barometric Pressure for Blood Gas 748 mmHg      Modality Ventilator     FIO2 40 %      Ventilator Mode AC     Set Tidal Volume 500     Set Mech Resp Rate 14.0     PEEP 5.0     Notified Who Dr Bates     Notified By [id number]     Notified Time 03/10/2018 06:20     Collected by [id number]    CBC Auto Differential [[id number]]  (Abnormal) Collected:  03/10/18 0609    Specimen:  Blood Updated:  03/10/18 0618     WBC 17.08 (H) 10*3/mm3      RBC 4.70 10*6/mm3      Hemoglobin 13.0 g/dL      Hematocrit 42.5 %      MCV 90.4 fL      MCH 27.7 (L)  pg      MCHC 30.6 (L) g/dL      RDW 14.7 %      RDW-SD 48.4 fl      MPV 9.7 fL      Platelets 250 10*3/mm3      Neutrophil % 74.2 %      Lymphocyte % 16.3 %      Monocyte % 7.1 %      Eosinophil % 0.1 %      Basophil % 0.4 %      Immature Grans % 1.9 %      Neutrophils, Absolute 12.66 (H) 10*3/mm3      Lymphocytes, Absolute 2.79 10*3/mm3      Monocytes, Absolute 1.21 10*3/mm3      Eosinophils, Absolute 0.02 10*3/mm3      Basophils, Absolute 0.07 10*3/mm3      Immature Grans, Absolute 0.33 (H) 10*3/mm3      nRBC 0.1 (H) /100 WBC     POC Glucose Once [[id number]]  (Abnormal) Collected:  03/10/18 0530    Specimen:  Blood Updated:  03/10/18 0556     Glucose 159 (H) mg/dL      Comment: : [id number] Juan Carlos JenniferMeter ID: [id number]       Blood Gas, Arterial [[id number]]  (Abnormal) Collected:  03/10/18 0242    Specimen:  Arterial Blood Updated:  03/10/18 0511     Site Arterial Line     Boo's Test N/A     pH, Arterial 7.293 (L) pH units      pCO2, Arterial 47.8 (H) mm Hg      pO2, Arterial 201.0 (H) mm Hg      HCO3, Arterial 23.1 mmol/L      Base Excess, Arterial -3.7 (L) mmol/L      O2 Saturation, Arterial 99.9 (H) %      Temperature 37.5 C      Barometric Pressure for Blood Gas 748 mmHg      Modality Ventilator     FIO2 60 %      Ventilator Mode Volume Support     Set Tidal Volume 500     Set [facility] Resp Rate 14.0     PEEP 5.0     Collected by [id number]    CBC Auto Differential [[id number]]  (Abnormal) Collected:  03/10/18 0150    Specimen:  Blood Updated:  03/10/18 0508     WBC 14.99 (H) 10*3/mm3      RBC 4.54 10*6/mm3      Hemoglobin 12.4 g/dL      Hematocrit 39.2 %      MCV 86.3 fL      MCH 27.3 (L) pg      MCHC 31.6 (L) g/dL      RDW 14.5 %      RDW-SD 45.6 fl      MPV 9.8 fL      Platelets 286 10*3/mm3      Neutrophil % 86.4 (H) %      Lymphocyte % 7.7 (L) %      Monocyte % 4.9 %      Eosinophil % 0.1 %      Basophil % 0.3 %      Immature Grans % 0.6 %      Neutrophils, Absolute 12.96 (H) 10*3/mm3      Lymphocytes,  Absolute 1.15 10*3/mm3      Monocytes, Absolute 0.74 10*3/mm3      Eosinophils, Absolute 0.01 10*3/mm3      Basophils, Absolute 0.04 10*3/mm3      Immature Grans, Absolute 0.09 (H) 10*3/mm3      nRBC 0.0 /100 WBC     Lactic Acid, Reflex [[id number]]  (Abnormal) Collected:  03/10/18 0408    Specimen:  Blood Updated:  03/10/18 0457     Lactate 5.2 (C) mmol/L     Comprehensive Metabolic Panel [[id number]]  (Abnormal) Collected:  03/10/18 0343    Specimen:  Blood Updated:  03/10/18 0454     Glucose 188 (H) mg/dL      BUN 19 mg/dL      Creatinine 0.98 mg/dL      Sodium 145 mmol/L      Potassium 3.3 (L) mmol/L      Chloride 105 mmol/L      CO2 24.0 mmol/L      Calcium 8.0 (L) mg/dL      Total Protein 6.7 g/dL      Albumin 3.50 g/dL      ALT (SGPT) 52 U/L      AST (SGOT) 51 (H) U/L      Alkaline Phosphatase 72 U/L      Total Bilirubin 0.8 mg/dL      eGFR  African Amer 74 mL/min/1.73      Globulin 3.2 gm/dL      A/G Ratio 1.1 g/dL      BUN/Creatinine Ratio 19.4     Anion Gap 16.0 (H) mmol/L     Magnesium [[id number]]  (Abnormal) Collected:  03/10/18 0343    Specimen:  Blood Updated:  03/10/18 0454     Magnesium 2.3 (H) mg/dL     Phosphorus [[id number]]  (Abnormal) Collected:  03/10/18 0343    Specimen:  Blood Updated:  03/10/18 0454     Phosphorus 5.4 (H) mg/dL     Lactic Acid, Plasma [[id number]]  (Normal) Collected:  03/10/18 0343    Specimen:  Blood Updated:  03/10/18 0454     Lactate 1.8 mmol/L     POC Glucose Once [[id number]]  (Abnormal) Collected:  03/10/18 0430    Specimen:  Blood Updated:  03/10/18 0441     Glucose 249 (H) mg/dL      Comment: : [id number] Juan Carlos AndersonWayne Memorial HospitalMeter ID: [id number]       Troponin [[id number]]  (Abnormal) Collected:  03/09/18 2257    Specimen:  Blood Updated:  03/10/18 0415     Troponin I 0.146 (H) ng/mL     CK Total & CKMB [[id number]]  (Abnormal) Collected:  03/09/18 2257    Specimen:  Blood Updated:  03/10/18 0415     CKMB 8.15 (H) ng/mL      Creatine Kinase 193 U/L     CK-MB Index  [[id number]]  (Normal) Collected:  03/09/18 2257    Specimen:  Blood Updated:  03/10/18 0415     CK-MB Index 4.2 %     hCG, Serum, Qualitative [[id number]]  (Normal) Collected:  03/10/18 0344    Specimen:  Blood Updated:  03/10/18 0401     HCG Qualitative Negative    Protime-INR [[id number]]  (Abnormal) Collected:  03/10/18 0343    Specimen:  Blood Updated:  03/10/18 0354     Protime 14.6 Seconds      INR 1.10 (H)    aPTT [[id number]]  (Normal) Collected:  03/10/18 0343    Specimen:  Blood Updated:  03/10/18 0353     PTT 26.3 seconds     POC Glucose Once [[id number]]  (Abnormal) Collected:  03/10/18 0147    Specimen:  Blood Updated:  03/10/18 0339     Glucose 191 (H) mg/dL      Comment: : [id number] [employer]YN[employer] Network GroupMeter ID: [id number]       Lactic Acid, Reflex Timer (This will reflex a repeat order 3-3:15 hours after ordered.) [[id number]] Collected:  03/09/18 2257    Specimen:  Blood Updated:  03/10/18 0231     Extra Tube Hold for add-ons.     Comment: Auto resulted.       POC Glucose Once [[id number]]  (Normal) Collected:  03/10/18 0028    Specimen:  Blood Updated:  03/10/18 0039     Glucose 118 mg/dL      Comment: : [id number] DigitalVisionniferMeter ID: [id number]       Blood Gas, Arterial [[id number]]  (Abnormal) Collected:  03/09/18 2350    Specimen:  Arterial Blood Updated:  03/09/18 2359     Site Right Radial     Boo's Test Positive     pH, Arterial 7.337 (L) pH units      pCO2, Arterial 43.7 mm Hg      pO2, Arterial 177.0 (H) mm Hg      HCO3, Arterial 23.4 mmol/L      Base Excess, Arterial -2.5 (L) mmol/L      O2 Saturation, Arterial 99.8 (H) %      Temperature 38.6 C      Barometric Pressure for Blood Gas 747 mmHg      Modality Ventilator     FIO2 80 %      Ventilator Mode AC     Set Tidal Volume 500     Set Mech Resp Rate 14.0     PEEP 5.0     Collected by [id number]    Urinalysis With / Culture If Indicated - Urine, Catheter [[id number]]  (Abnormal) Collected:  03/09/18 4484    Specimen:  Urine from Urine,  Catheter Updated:  03/09/18 2349     Color, UA Yellow     Appearance, UA Cloudy (A)     pH, UA 7.0     Specific Gravity, UA 1.019     Glucose,  mg/dL (Trace) (A)     Ketones, UA Negative     Bilirubin, UA Negative     Blood, UA Small (1+) (A)     Protein, UA >=300 mg/dL (3+) (A)     Leuk Esterase, UA Negative     Nitrite, UA Negative     Urobilinogen, UA 0.2 E.U./dL    Urinalysis, Microscopic Only - Urine, Clean Catch [[id number]]  (Abnormal) Collected:  03/09/18 2237    Specimen:  Urine from Urine, Catheter Updated:  03/09/18 2349     RBC, UA 6-12 (A) /HPF      WBC, UA 6-12 (A) /HPF      Bacteria, UA 1+ (A) /HPF      Squamous Epithelial Cells, UA None Seen /HPF      Renal Epithelial Cells, UA 3-6 (A) /HPF      Hyaline Casts, UA 0-2 /LPF      Oval Fat Bodies, UA Small/1+ /HPF      Methodology Manual Light Microscopy     Observations, UA  /HPF      1-3 renal epithelial casts/LPF, 2+ free fat globules    Urine Drug Screen - Urine, Clean Catch [[id number]]  (Normal) Collected:  03/09/18 2237    Specimen:  Urine from Urine, Catheter Updated:  03/09/18 2344     Amphetamine Screen, Urine Negative     Barbiturates Screen, Urine Negative     Benzodiazepine Screen, Urine Negative     Cocaine Screen, Urine Negative     Methadone Screen, Urine Negative     Opiate Screen Negative     Phencyclidine (PCP), Urine Negative     THC, Screen, Urine Negative    Pregnancy, Urine - Urine, Clean Catch [[id number]]  (Normal) Collected:  03/09/18 2237    Specimen:  Urine from Urine, Catheter Updated:  03/09/18 2330     HCG, Urine QL Negative    hCG, Serum, Qualitative [[id number]]  (Normal) Collected:  03/09/18 2257    Specimen:  Blood Updated:  03/09/18 2321     HCG Qualitative Negative    Lactic Acid, Plasma [[id number]]  (Abnormal) Collected:  03/09/18 2257    Specimen:  Blood Updated:  03/09/18 2320     Lactate 3.4 (C) mmol/L     Phosphorus [[id number]]  (Abnormal) Collected:  03/09/18 2257    Specimen:  Blood Updated:  03/09/18 2313      Phosphorus 4.7 (H) mg/dL     TSH [[id number]]  (Normal) Collected:  03/09/18 2146    Specimen:  Blood Updated:  03/09/18 2305     TSH 3.620 mIU/mL     Calcium, Ionized [[id number]]  (Abnormal) Collected:  03/09/18 2254    Specimen:  Blood Updated:  03/09/18 2300     Ionized Calcium 4.46 (L) mg/dL      Collected by Niels Vaca RN    T4, Free [[id number]]  (Normal) Collected:  03/09/18 2146    Specimen:  Blood Updated:  03/09/18 2251     Free T4 1.32 ng/dL     Procalcitonin [[id number]]  (Normal) Collected:  03/09/18 2146    Specimen:  Blood Updated:  03/09/18 2242     Procalcitonin <0.25 ng/mL     CK [[id number]]  (Normal) Collected:  03/09/18 2146    Specimen:  Blood Updated:  03/09/18 2234     Creatine Kinase 176 U/L     Troponin [[id number]]  (Abnormal) Collected:  03/09/18 2146    Specimen:  Blood Updated:  03/09/18 2220     Troponin I 0.059 (H) ng/mL     Ethanol [[id number]]  (Normal) Collected:  03/09/18 2146    Specimen:  Blood Updated:  03/09/18 2220     Ethanol % <0.010 %     Narrative:       Not for legal purposes. Chain of Custody not followed.     CK Total & CKMB [[id number]]  (Normal) Collected:  03/09/18 2146    Specimen:  Blood Updated:  03/09/18 2220     CKMB 4.23 ng/mL      Creatine Kinase 176 U/L     Narrative:       CKMB Index not indicated    BNP [[id number]]  (Abnormal) Collected:  03/09/18 2146    Specimen:  Blood Updated:  03/09/18 2220     proBNP 4,230.0 (H) pg/mL     Magnesium [[id number]]  (Normal) Collected:  03/09/18 2146    Specimen:  Blood Updated:  03/09/18 2214     Magnesium 2.1 mg/dL      Comment: Specimen hemolyzed.  Results may be affected.       Comprehensive Metabolic Panel [[id number]]  (Abnormal) Collected:  03/09/18 2146    Specimen:  Blood Updated:  03/09/18 2213     Glucose 180 (H) mg/dL      BUN 18 mg/dL      Comment: Specimen hemolyzed. Results may be affected.        Creatinine 1.14 mg/dL      Sodium 142 mmol/L      Potassium 3.1 (L) mmol/L      Comment: Specimen hemolyzed.   Results may be affected.        Chloride 101 mmol/L      CO2 25.0 mmol/L      Calcium 8.5 mg/dL      Total Protein 7.6 g/dL      Albumin 4.00 g/dL      ALT (SGPT) 50 U/L      Comment: Specimen hemolyzed.  Results may be affected.        AST (SGOT) 53 (H) U/L      Comment: Specimen hemolyzed.  Results may be affected.        Alkaline Phosphatase 84 U/L      Comment: Specimen hemolyzed. Results may be affected.        Total Bilirubin 0.8 mg/dL      eGFR   Amer 62 mL/min/1.73      Globulin 3.6 gm/dL      A/G Ratio 1.1 g/dL      BUN/Creatinine Ratio 15.8     Anion Gap 16.0 (H) mmol/L     Protime-INR [[id number]]  (Normal) Collected:  03/09/18 2146    Specimen:  Blood Updated:  03/09/18 2207     Protime 14.5 Seconds      INR 1.09    aPTT [[id number]]  (Normal) Collected:  03/09/18 2146    Specimen:  Blood Updated:  03/09/18 2207     PTT 25.4 seconds     CBC Auto Differential [[id number]]  (Abnormal) Collected:  03/09/18 2146    Specimen:  Blood Updated:  03/09/18 2158     WBC 20.13 (H) 10*3/mm3      RBC 4.80 10*6/mm3      Hemoglobin 13.1 g/dL      Hematocrit 42.5 %      MCV 88.5 fL      MCH 27.3 (L) pg      MCHC 30.8 (L) g/dL      RDW 14.4 %      RDW-SD 46.5 fl      MPV 9.7 fL      Platelets 313 10*3/mm3      Neutrophil % 84.2 (H) %      Lymphocyte % 10.2 (L) %      Monocyte % 3.4 (L) %      Eosinophil % 0.6 %      Basophil % 0.3 %      Immature Grans % 1.3 %      Neutrophils, Absolute 16.94 (H) 10*3/mm3      Lymphocytes, Absolute 2.06 10*3/mm3      Monocytes, Absolute 0.68 10*3/mm3      Eosinophils, Absolute 0.12 10*3/mm3      Basophils, Absolute 0.07 10*3/mm3      Immature Grans, Absolute 0.26 (H) 10*3/mm3      nRBC 0.0 /100 WBC     Blood Gas, Arterial [[id number]]  (Abnormal) Collected:  03/09/18 2255    Specimen:  Arterial Blood Updated:  03/09/18 2155     Site Right Radial     Boo's Test Positive     pH, Arterial 7.271 (L) pH units      pCO2, Arterial 48.4 (H) mm Hg      pO2, Arterial 118.0 (H) mm Hg      HCO3,  Arterial 22.3 mmol/L      Base Excess, Arterial -4.9 (L) mmol/L      O2 Saturation, Arterial 98.0 %      Temperature 37.0 C      Barometric Pressure for Blood Gas 748 mmHg      Modality Ventilator     FIO2 100 %      Ventilator Mode AC     Set Tidal Volume 470     Set [facility] Resp Rate 14.0     PEEP 5.0     Collected by [id number]          Imaging Results (last 24 hours)     Procedure Component Value Units Date/Time    XR Chest 1 View [[id number]] Collected:  03/10/18 0714     Updated:  03/10/18 0719    Narrative:       EXAMINATION:   XR CHEST 1 VW-  3/10/2018 7:14 AM CST     HISTORY: Baseline     Frontal upright radiograph of the chest 3/10/2018 3:15 AM CST     COMPARISON: March 9, 2018.     FINDINGS:   Right perihilar infiltrates present. There is atelectasis left lower  lobe.. Cardiac silhouettes enlarged and the findings are most consistent  with pulmonary vascular congestion. Endotracheal tube is present..      The osseous structures and surrounding soft tissues demonstrate no acute  abnormality.       Impression:       1. Cardiomegaly bilateral infiltrates most likely representing pulmonary  vascular congestion.  2. Atelectasis left lower lobe is now present..        This report was finalized on 03/10/2018 07:16 by Dr. Freedom Vásquez MD.    CT Abdomen Pelvis With Contrast [[id number]] Collected:  03/09/18 2229     Updated:  03/09/18 2238    Narrative:       CT ABDOMEN PELVIS W CONTRAST- 3/9/2018 9:11 PM CST     HISTORY: cardiac arrest       COMPARISON: None.      DLP: 2204 mGy cm. Automated exposure control was utilized to diminish  patient radiation dose.     TECHNIQUE: Following the intravenous administration of contrast, helical  CT tomographic images of the abdomen and pelvis were acquired. Coronal  reformatted images were also provided for review.      FINDINGS:   Bilateral lower lobe airspace disease is present for which I would favor  atelectasis. Early pulmonary edema may also contribute to this finding.  There  is moderate cardiomegaly. No evidence of pericardial effusion..      LIVER: No focal liver lesion. The hepatic vasculature is patent.      BILIARY SYSTEM: The gallbladder is unremarkable. No intrahepatic or  extrahepatic ductal dilatation.      PANCREAS: No focal pancreatic lesion.      SPLEEN: Unremarkable.      KIDNEYS AND ADRENALS: Bilateral kidneys and adrenal glands are  unremarkable. The ureters are decompressed and normal in appearance.     RETROPERITONEUM: No mass, lymphadenopathy or hemorrhage.      GI TRACT: No evidence of obstruction or bowel wall thickening. The  appendix is visualized and unremarkable.     OTHER: There is no mesenteric mass, lymphadenopathy or fluid collection.  The abdominopelvic vasculature is patent. The osseous structures and  soft tissues demonstrate no worrisome lesions. There is a small amount  of free fluid within the pelvis. There is some stranding within the  subcutaneous tissue suggests there may be some third spacing of fluid.  The patient is facet status post hysterectomy.      PELVIS: No evidence of mass. The patient is status post hysterectomy..  The urinary bladder is normal in appearance.       Impression:       1. Patchy bilateral airspace disease within the lower lobes. This is in  part related atelectasis but some superimposed pulmonary edema is also a  differential. There is some free fluid within the pelvis as well as some  stranding within the subcutaneous tissue suggesting that there may be  some third spacing of fluid within the subcutaneous tissues suggesting  developing anasarca.  2. The soft tissue organs are unremarkable. The abdominal aorta is  normal in caliber with no evidence of dissection or aneurysm..      3. Moderate cardiomegaly. No evidence of pericardial effusion.     This report was finalized on 03/09/2018 22:35 by Dr. Pravin Jean-Baptiste MD.    CT Chest With Contrast [[id number]] Collected:  03/09/18 2223     Updated:  03/09/18 2232    Narrative:        CT CHEST W CONTRAST- 3/9/2018 9:11 PM CST     HISTORY: cardiac arrest      COMPARISON: None      DLP: 554 mGy cm. Automated exposure control was utilized to diminish  patient radiation dose.     TECHNIQUE: Serial helical tomographic images of the chest were acquired  following the infusion of Isovue contrast intravenously. Bone and soft  tissue algorithms were provided.       FINDINGS:   Neck base: The imaged portion of the neck and thyroid gland is  unremarkable. The patient is intubated with endotracheal tube  well-positioned.     Lungs: There is patchy bilateral airspace disease. The disease within  the lower lobes I suspect represents some atelectasis related to a poor  inspiratory effort. However, the disease within the perihilar aspect of  both lungs and both upper lobes could be related to atelectasis or some  early developing pulmonary edema. Aspiration pneumonia would also be a  differential.. No pleural effusion is present. The trachea and bronchial  tree are patent.      Heart: There is moderate cardiomegaly.. There is no pericardial  effusion.      Vasculature: Aorta is normal in caliber and appearance without  significant atherosclerosis, stenosis, or aneurysm. No coronary  arteriosclerosis identified. The great vessels are normal in appearance.  The pulmonary arteries are normal in appearance.     Lymph nodes: No enlarged axillary, hilar, or mediastinal lymph nodes.      Bones and soft tissues: No acute osseous or soft tissue abnormality is  seen. There are no worrisome osseous lesions.      Upper abdomen: The imaged portion of the upper abdomen is unremarkable.        Impression:       1. Bilateral airspace disease some of which is related to a poor  inspiratory effort. The patient is intubated. Upper lobe and perihilar  airspace disease could represent some early developing pulmonary edema.  No effusions are present. There is moderate cardiomegaly.  2. The thoracic aorta is normal in caliber with  no definite dissection  or aneurysm. The proximal great vessels are unremarkable..        This report was finalized on 03/09/2018 22:28 by Dr. Pravin Jean-Baptiste MD.    CT Cervical Spine Without Contrast [[id number]] Collected:  03/09/18 2153     Updated:  03/09/18 2159    Narrative:       CT CERVICAL SPINE WO CONTRAST- 3/9/2018 8:53 PM CST     HISTORY: cardiac arrest     COMPARISON: None      DOSE LENGTH PRODUCT: 364 mGy cm. Automated exposure control was utilized  to diminish patient radiation dose.     TECHNIQUE: Serial helical tomographic images of the cervical spine were  obtained without the use of intravenous contrast. Additionally, sagittal  and coronal reformatted images were also provided for review. The study  is markedly degraded by motion artifact as well as beam hardening.     FINDINGS:   Alignment: Normal.     Bones: There is no evidence of fracture. Vertebral body heights are  maintained.     Disc spaces: Degenerative disc disease is noted at C4-C5, C5-C6 and  C6-C7 with narrowing of the space height and spondylosis.     Canal and neuroforamina: Bilateral neural foraminal narrowing is noted  at C5-C6 related uncinate spurring.     Soft tissues: Unremarkable.     Lung apices: Patchy groundglass disease is noted within the upper lobes  suggesting interstitial edema.       Impression:       1. Arthritic changes. No evidence of acute osseous injury or  malalignment. The study is degraded by motion.  2. Groundglass disease within the upper lung apices suspicious for  interstitial pulmonary edema.        This report was finalized on 03/09/2018 21:56 by Dr. Pravin Jean-Baptiste MD.    XR Chest 1 View [[id number]] Collected:  03/09/18 2152     Updated:  03/09/18 2156    Narrative:       EXAMINATION: Chest one view 3/9/2018     HISTORY: Postintubation     FINDINGS: Today's exam is compared to previous study of 12/4/2017. The  patient has been intubated with endotracheal tube tip well-positioned.  There is  cardiomegaly with pulmonary vascular congestion suggesting  developing pulmonary venous hypertension and early pulmonary edema.       Impression:       1.. Marked cardiomegaly with pulmonary vascular congestion.  2. Endotracheal tube well positioned.  This report was finalized on 03/09/2018 21:53 by Dr. Pravin Jean-Baptiste MD.    CT Head Without Contrast [[id number]] Collected:  03/09/18 2147     Updated:  03/09/18 2154    Narrative:       EXAMINATION: CT head without contrast 3/9/2018     HISTORY: Cardiac arrest.     DOSE: 1079 gray centimeter. Automated exposure control was utilized to  diminish patient radiation dose..     FINDINGS: Multiple contiguous axial images are obtained from the skull  base to the vertex per protocol without intravenous contrast-enhancement  with reformatted images obtained in the sagittal and coronal  projections. The study is limited as the patient was unable to cooperate  with the exam. We attempted scanning the patient several times. I do not  see gross evidence of mass effect or shift of the midline. No gross  evidence of hemorrhage. No shift of the midline structures. No acute  calvarial abnormalities are present. The orbits are grossly  unremarkable. The visualized paranasal sinuses and mastoid air cells are  clear.       Impression:       1.. Limited exam secondary to extensive motion artifact even after  repeating the sequence multiple times.  2. Grossly unremarkable nonenhanced CT of the brain.  This report was finalized on 03/09/2018 21:51 by Dr. Pravin Jean-Baptiste MD.             Intake/Output Summary (Last 24 hours) at 03/10/18 0904  Last data filed at 03/10/18 0330   Gross per 24 hour   Intake                0 ml   Output              350 ml   Net             -350 ml       Physical Exam   Constitutional: She appears well-developed and well-nourished. She appears toxic. She is sedated and intubated.   Morbidly obese   HENT:   Head: Normocephalic and atraumatic.   Nose: Nose  normal.   Mouth/Throat: Oropharynx is clear and moist.   Endotracheal tube in place   Eyes: Conjunctivae and lids are normal. No scleral icterus.   Pupils 2 mm and fixed   Neck: Neck supple. No JVD present. No tracheal deviation present. No thyromegaly present.   Cardiovascular: Regular rhythm, normal heart sounds and intact distal pulses.  Tachycardia present.    No murmur heard.  Pulmonary/Chest: She is intubated. She has decreased breath sounds in the right lower field and the left lower field. She has rhonchi (scattered in upper fields).   Abdominal: Soft. Bowel sounds are normal. She exhibits no distension and no mass.   Abdomen very obese   Musculoskeletal: Normal range of motion. She exhibits edema (2/4 BLE). She exhibits no deformity.   Neurological: She is unresponsive.   Skin: Skin is warm. No rash noted. No erythema.       Results Review:  I have reviewed the labs, radiology results, and diagnostic studies since my last progress note and made treatment changes reflective of the results.   I have reviewed the current medications.    Assessment/Plan     Hospital Problem List     * (Principal)Cardiac arrest    Cardiogenic shock    Metabolic acidosis    Elevated troponin    Lactic acidosis    Cardiomegaly    Hypertension          PLAN:  Protonix 40 mg IV q 24 h  Pulmonary consult for vent mgt, etc.  ECHO  Serial troponins  Bicarb drip 150 meq/L at 75 ml/hr  Blood cultures X 2 from 2 sites  D/C Rocephin  Broaden antibiotic coverage with vancomycin dosed per pharmacy and Zosyn 4.5 g every 8 hours  Assess for possible anoxic brain injury after rewarming and when normal temperature is achieved    Thierno Winchester DO   03/10/18   9:39 AM     Approximately 70 minutes of critical care time were spent managing the patient exclusive of billable procedures.

## 2018-03-10 NOTE — PROGRESS NOTES
PT TRANSPORTED TO CCU#12 BAGGED WITH 100% O2. ET TUBE PRIOR TO LEAVING AT 24, UPON ARRIVAL TO ICU AT 24.  TUBE PLACEMENT VERIFIED BY RT, CATARINA GRANT AND RN, EREN FONG PRIOR, UPON ARRIVAL TO ICU#12. ETCO2 STABLE AROUND 36-38 AND PULSE OX STABLE AROUND 100%.  PT PLACED ON SAME VENT SETTINGS.  NO COMPLICATIONS. REPORT GIVEN TO GALE GOOD RRT.

## 2018-03-10 NOTE — PLAN OF CARE
Problem: Patient Care Overview  Goal: Plan of Care Review  Outcome: Ongoing (interventions implemented as appropriate)   03/10/18 1650   Plan of Care Review   Progress no change   OTHER   Outcome Summary Initial RDN eval. Pt [facility]. May benefit from AMONS when medically appropriate per MD. Propofol currently providing ~882 kcal/day. No nutritional interventions at this time. Will continue to follow and adjust recommendations as needed.

## 2018-03-11 PROBLEM — N17.9 AKI (ACUTE KIDNEY INJURY) (HCC): Status: ACTIVE | Noted: 2018-01-01

## 2018-03-11 PROBLEM — I47.20 VENTRICULAR TACHYCARDIA (HCC): Status: ACTIVE | Noted: 2018-01-01

## 2018-03-11 PROBLEM — K72.00 SHOCK LIVER: Status: ACTIVE | Noted: 2018-01-01

## 2018-03-11 PROBLEM — G93.1 ANOXIC BRAIN INJURY (HCC): Status: ACTIVE | Noted: 2018-01-01

## 2018-03-11 NOTE — PROGRESS NOTES
[facility] Medicine Services  INPATIENT PROGRESS NOTE    Length of Stay: 2  Date of Admission: 3/9/2018  Primary Care Physician: SB Michel    Subjective     Chief Complaint:     Status post cardiac arrest    HPI     The patient remains intubated and sedated.  The rewarming phase has begun today. The patient is again requiring 3 pressors. Labs indicate acute kidney injury and shock liver.  She remains acidotic despite bicarbonate.  Bloody secretions are being suctioned from her ET tube.  Respiratory culture shows scant growth of normal respiratory vlaentine.  Lactic acid peaked at 11.7 last evening and is improved but still elevated at 10.8 this morning.  Has acidosis persists with a pH of 7.143 this morning despite bicarbonate drip and multiple bicarbonate boluses.  WBC Elevated at 20,500 with left shift.  Creatinine continues to drift upward at 1.83 and transaminases continued to drift upward significantly as well.  The patient's sister and brother-in-law were at bedside and I discussed the patients condition with them at length.  Patient's daughter will be returning to the hospital later today and I will discuss the patient's condition with her as well.  Chest x-ray continues to reveal marked cardiomegaly with bilateral infiltrates likely representing pulmonary vascular congestion.  Intake has exceeded output by over 7800 mL.  Diuretics have previously been held secondary to hypotension and poor peripheral perfusion. I will give a pone time dose of Lasix this AM and defer further use of diuretics to Cardiology.  Pupils remain pinpoint and fixed at 2 mm.    Review of Systems   Unable to obtain secondary to intubation and sedation.   All pertinent negatives and positives are as above. All other systems have been reviewed and are negative unless otherwise stated.     Objective    Temp:  [93 °F (33.9 °C)-94.3 °F (34.6 °C)] 93.3 °F (34.1 °C)  Heart Rate:  []  88  Resp:  [22] 22  BP: ()/() 136/99  Arterial Line BP: ()/() 125/96  FiO2 (%):  [40 %-50 %] 50 %    Lab Results (last 24 hours)     Procedure Component Value Units Date/Time    POC Glucose Once [[id number]]  (Abnormal) Collected:  03/11/18 0839    Specimen:  Blood Updated:  03/11/18 0858     Glucose 133 (H) mg/dL      Comment: : [id number] Luis Alberto (Bone) ShannonMeter ID: [id number]       Respiratory Culture - Sputum, Bronchus [[id number]] Collected:  03/10/18 1626    Specimen:  Sputum from Bronchus Updated:  03/11/18 0753     Respiratory Culture --      Scant growth (1+) Normal Respiratory Ava    CBC & Differential [[id number]] Collected:  03/11/18 0637    Specimen:  Blood Updated:  03/11/18 0737    Narrative:       The following orders were created for panel order CBC & Differential.  Procedure                               Abnormality         Status                     ---------                               -----------         ------                     CBC Auto Differential[[id number]]        Abnormal            Final result                 Please view results for these tests on the individual orders.    CBC Auto Differential [[id number]]  (Abnormal) Collected:  03/11/18 0637    Specimen:  Blood Updated:  03/11/18 0737     WBC 20.47 (H) 10*3/mm3      RBC 5.36 10*6/mm3      Hemoglobin 14.7 g/dL      Hematocrit 48.5 (H) %      MCV 90.5 fL      MCH 27.4 (L) pg      MCHC 30.3 (L) g/dL      RDW 15.2 (H) %      RDW-SD 49.9 fl      MPV 9.9 fL      Platelets 232 10*3/mm3      Neutrophil % 78.2 (H) %      Lymphocyte % 13.7 (L) %      Monocyte % 7.4 %      Eosinophil % 0.0 %      Basophil % 0.1 %      Immature Grans % 0.6 %      Neutrophils, Absolute 16.00 (H) 10*3/mm3      Lymphocytes, Absolute 2.80 10*3/mm3      Monocytes, Absolute 1.51 (H) 10*3/mm3      Eosinophils, Absolute 0.00 10*3/mm3      Basophils, Absolute 0.03 10*3/mm3      Immature Grans, Absolute 0.13 (H) 10*3/mm3      nRBC 0.1 (H)  /100 WBC     Lactic Acid, Plasma [[id number]]  (Abnormal) Collected:  03/11/18 0636    Specimen:  Blood Updated:  03/11/18 0728     Lactate 10.8 (C) mmol/L     Urine Culture - Urine, Urine, Clean Catch [[id number]]  (Normal) Collected:  03/09/18 2237    Specimen:  Urine from Urine, Catheter Updated:  03/11/18 0710     Urine Culture No growth at 2 days    Comprehensive Metabolic Panel [[id number]]  (Abnormal) Collected:  03/11/18 0637    Specimen:  Blood Updated:  03/11/18 0708     Glucose 134 (H) mg/dL      BUN 22 (H) mg/dL      Comment: Specimen hemolyzed. Results may be affected.        Creatinine 1.83 (H) mg/dL      Sodium 145 mmol/L      Potassium 4.1 mmol/L      Chloride 105 mmol/L      CO2 14.0 (L) mmol/L      Calcium 7.2 (L) mg/dL      Total Protein 6.5 g/dL      Albumin 3.40 (L) g/dL      ALT (SGPT) 337 (H) U/L      AST (SGOT) 533 (H) U/L      Alkaline Phosphatase 61 U/L      Total Bilirubin 1.6 (H) mg/dL      eGFR   Amer 36 (L) mL/min/1.73      Globulin 3.1 gm/dL      A/G Ratio 1.1 g/dL      BUN/Creatinine Ratio 12.0     Anion Gap 26.0 (H) mmol/L     Magnesium [[id number]]  (Abnormal) Collected:  03/11/18 0637    Specimen:  Blood Updated:  03/11/18 0704     Magnesium 2.3 (H) mg/dL     Phosphorus [[id number]]  (Abnormal) Collected:  03/11/18 0637    Specimen:  Blood Updated:  03/11/18 0704     Phosphorus 8.6 (H) mg/dL     aPTT [[id number]]  (Normal) Collected:  03/11/18 0637    Specimen:  Blood Updated:  03/11/18 0658     PTT 33.1 seconds     POC Glucose Once [[id number]]  (Normal) Collected:  03/11/18 0619    Specimen:  Blood Updated:  03/11/18 0631     Glucose 120 mg/dL      Comment: : [id number] [facility]Tem[facility] HospitalM[facility]: [id number]       Calcium, Ionized [[id number]]  (Abnormal) Collected:  03/11/18 0620    Specimen:  Blood Updated:  03/11/18 0622     Ionized Calcium 3.38 (L) mg/dL      Collected by [id number]    Blood Gas, Arterial [[id number]]  (Abnormal) Collected:  03/11/18 0408    Specimen:  Arterial  Blood Updated:  03/11/18 0432     Site Arterial Line     Boo's Test N/A     pH, Arterial 7.143 (C) pH units      pCO2, Arterial 38.2 mm Hg      pO2, Arterial 71.9 (L) mm Hg      HCO3, Arterial 13.1 (L) mmol/L      Base Excess, Arterial -15.2 (L) mmol/L      O2 Saturation, Arterial 91.0 (L) %      Temperature 37.0 C      Barometric Pressure for Blood Gas 749 mmHg      Modality Ventilator     FIO2 40 %      Ventilator Mode AC     Set Tidal Volume 500     Set Mech Resp Rate 22.0     PEEP 5.0     Notified [facility] [id number]     Notified By [id number]     Notified Time 03/11/2018 04:21     Collected by [id number]    POC Glucose Once [[id number]]  (Normal) Collected:  03/11/18 0408    Specimen:  Blood Updated:  03/11/18 0422     Glucose 122 mg/dL      Comment: : [id number] Juan Carlos GleamniCrest OpticsMeter ID: [id number]       POC Glucose Once [[id number]]  (Normal) Collected:  03/11/18 0320    Specimen:  Blood Updated:  03/11/18 0332     Glucose 99 mg/dL      Comment: : [id number] [employer] Property GroupniCrest OpticsMeter ID: [id number]       Lactic Acid, Plasma [[id number]]  (Abnormal) Collected:  03/10/18 2353    Specimen:  Blood Updated:  03/11/18 0051     Lactate 10.9 (C) mmol/L     Calcium, Ionized [[id number]]  (Abnormal) Collected:  03/11/18 0026    Specimen:  Blood Updated:  03/11/18 0038     Ionized Calcium 3.75 (L) mg/dL      Collected by [id number]    Troponin [[id number]]  (Abnormal) Collected:  03/10/18 2354    Specimen:  Blood Updated:  03/11/18 0028     Troponin I 0.409 (H) ng/mL     CBC & Differential [[id number]] Collected:  03/10/18 2353    Specimen:  Blood Updated:  03/11/18 0020    Narrative:       The following orders were created for panel order CBC & Differential.  Procedure                               Abnormality         Status                     ---------                               -----------         ------                     CBC Auto Differential[[id number]]        Abnormal            Final result                 Please view results for  these tests on the individual orders.    CBC Auto Differential [[id number]]  (Abnormal) Collected:  03/10/18 2353    Specimen:  Blood Updated:  03/11/18 0020     WBC 17.92 (H) 10*3/mm3      RBC 5.23 10*6/mm3      Hemoglobin 14.4 g/dL      Hematocrit 47.4 (H) %      MCV 90.6 fL      MCH 27.5 (L) pg      MCHC 30.4 (L) g/dL      RDW 15.0 %      RDW-SD 49.8 fl      MPV 9.6 fL      Platelets 220 10*3/mm3      Neutrophil % 80.8 (H) %      Lymphocyte % 12.7 (L) %      Monocyte % 4.9 %      Eosinophil % 0.1 %      Basophil % 0.3 %      Immature Grans % 1.2 %      Neutrophils, Absolute 14.49 (H) 10*3/mm3      Lymphocytes, Absolute 2.28 10*3/mm3      Monocytes, Absolute 0.87 10*3/mm3      Eosinophils, Absolute 0.01 10*3/mm3      Basophils, Absolute 0.05 10*3/mm3      Immature Grans, Absolute 0.22 (H) 10*3/mm3      nRBC 0.2 (H) /100 WBC     Comprehensive Metabolic Panel [[id number]]  (Abnormal) Collected:  03/10/18 2354    Specimen:  Blood Updated:  03/11/18 0017     Glucose 159 (H) mg/dL      BUN 21 mg/dL      Creatinine 1.57 (H) mg/dL      Sodium 147 (H) mmol/L      Potassium 3.8 mmol/L      Chloride 106 mmol/L      CO2 15.0 (L) mmol/L      Calcium 7.1 (L) mg/dL      Total Protein 6.2 (L) g/dL      Albumin 3.20 (L) g/dL      ALT (SGPT) 164 (H) U/L      AST (SGOT) 271 (H) U/L      Alkaline Phosphatase 66 U/L      Total Bilirubin 1.6 (H) mg/dL      eGFR   Amer 43 (L) mL/min/1.73      Globulin 3.0 gm/dL      A/G Ratio 1.1 g/dL      BUN/Creatinine Ratio 13.4     Anion Gap 26.0 (H) mmol/L     Phosphorus [[id number]]  (Abnormal) Collected:  03/10/18 2354    Specimen:  Blood Updated:  03/11/18 0017     Phosphorus 8.0 (H) mg/dL     Magnesium [[id number]]  (Abnormal) Collected:  03/10/18 2354    Specimen:  Blood Updated:  03/11/18 0017     Magnesium 2.3 (H) mg/dL     aPTT [[id number]]  (Normal) Collected:  03/10/18 2354    Specimen:  Blood Updated:  03/11/18 0012     PTT 34.3 seconds     POC Glucose Once [[id number]]  (Normal)  Collected:  03/10/18 2351    Specimen:  Blood Updated:  03/11/18 0002     Glucose 98 mg/dL      Comment: : [id number] Juan Carlos ConnollyniferMeter ID: [id number]       Blood Culture With IVAN - Blood, [[id number]]  (Normal) Collected:  03/10/18 1026    Specimen:  Blood from Blood, Arterial Line Updated:  03/10/18 2331     Blood Culture No growth at less than 24 hours    Blood Culture With IVAN - Blood, [[id number]]  (Normal) Collected:  03/10/18 1041    Specimen:  Blood from Blood, Central Line Updated:  03/10/18 2331     Blood Culture No growth at less than 24 hours    POC Glucose Once [[id number]]  (Normal) Collected:  03/10/18 2201    Specimen:  Blood Updated:  03/10/18 2212     Glucose 78 mg/dL      Comment: : [id number] Juan Carlos ConnollyniferMeter ID: [id number]       POC Glucose Once [[id number]]  (Abnormal) Collected:  03/10/18 2039    Specimen:  Blood Updated:  03/10/18 2050     Glucose 59 (L) mg/dL      Comment: : [id number] Juan Carlos ConnollyniferMeter ID: [id number]       Blood Gas, Arterial [[id number]]  (Abnormal) Collected:  03/10/18 1845    Specimen:  Arterial Blood Updated:  03/10/18 1858     Site Arterial Line     Boo's Test N/A     pH, Arterial 7.118 (C) pH units      pCO2, Arterial 34.8 (L) mm Hg      pO2, Arterial 139.0 (H) mm Hg      HCO3, Arterial 11.2 (L) mmol/L      Base Excess, Arterial -17.2 (L) mmol/L      O2 Saturation, Arterial 98.5 %      Temperature 37.0 C      Barometric Pressure for Blood Gas 749 mmHg      Modality Ventilator     FIO2 40 %      Ventilator Mode AC     Set Tidal Volume 500     Set Mech Resp Rate 22.0     PEEP 5.0     Notified Who [id number]     Notified By [id number]     Notified Time 03/10/2018 18:58     Collected by [id number]    Calcium, Ionized [[id number]]  (Abnormal) Collected:  03/10/18 1839    Specimen:  Blood Updated:  03/10/18 1849     Ionized Calcium 3.76 (L) mg/dL      Collected by [id number]    Lactic Acid, Plasma [[id number]]  (Abnormal) Collected:  03/10/18 1736    Specimen:  Blood Updated:   03/10/18 1823     Lactate 11.7 (C) mmol/L     Troponin [[id number]]  (Abnormal) Collected:  03/10/18 1737    Specimen:  Blood Updated:  03/10/18 1807     Troponin I 0.466 (H) ng/mL     Magnesium [[id number]]  (Abnormal) Collected:  03/10/18 1737    Specimen:  Blood Updated:  03/10/18 1757     Magnesium 2.7 (H) mg/dL      Comment: Specimen hemolyzed.  Results may be affected.       Phosphorus [[id number]]  (Abnormal) Collected:  03/10/18 1737    Specimen:  Blood Updated:  03/10/18 1757     Phosphorus 8.6 (H) mg/dL      Comment: Specimen hemolyzed.  Results may be affected.       Comprehensive Metabolic Panel [[id number]]  (Abnormal) Collected:  03/10/18 1737    Specimen:  Blood Updated:  03/10/18 1756     Glucose 95 mg/dL      BUN 20 mg/dL      Comment: Specimen hemolyzed. Results may be affected.        Creatinine 1.58 (H) mg/dL      Sodium 144 mmol/L      Potassium 4.4 mmol/L      Comment: Specimen hemolyzed.  Results may be affected.        Chloride 106 mmol/L      CO2 11.0 (L) mmol/L      Calcium 7.1 (L) mg/dL      Total Protein 6.7 g/dL      Albumin 3.50 g/dL      ALT (SGPT) 86 (H) U/L      Comment: Specimen hemolyzed.  Results may be affected.        AST (SGOT) 166 (H) U/L      Comment: Specimen hemolyzed.  Results may be affected.        Alkaline Phosphatase 70 U/L      Comment: Specimen hemolyzed. Results may be affected.        Total Bilirubin 1.3 (H) mg/dL      eGFR   Amer 43 (L) mL/min/1.73      Globulin 3.2 gm/dL      A/G Ratio 1.1 g/dL      BUN/Creatinine Ratio 12.7     Anion Gap 27.0 (H) mmol/L     POC Glucose Once [[id number]]  (Normal) Collected:  03/10/18 1734    Specimen:  Blood Updated:  03/10/18 1754     Glucose 89 mg/dL      Comment: : [id number] Luis Alberto (Bone) ShannonMeter ID: [id number]       CBC & Differential [[id number]] Collected:  03/10/18 1736    Specimen:  Blood Updated:  03/10/18 1752    Narrative:       The following orders were created for panel order CBC &  Differential.  Procedure                               Abnormality         Status                     ---------                               -----------         ------                     CBC Auto Differential[[id number]]        Abnormal            Final result                 Please view results for these tests on the individual orders.    CBC Auto Differential [[id number]]  (Abnormal) Collected:  03/10/18 1736    Specimen:  Blood Updated:  03/10/18 1752     WBC 20.46 (H) 10*3/mm3      RBC 5.38 10*6/mm3      Hemoglobin 14.6 g/dL      Hematocrit 49.2 (H) %      MCV 91.4 fL      MCH 27.1 (L) pg      MCHC 29.7 (L) g/dL      RDW 15.2 (H) %      RDW-SD 50.4 fl      MPV 9.7 fL      Platelets 249 10*3/mm3      Neutrophil % 77.4 %      Lymphocyte % 13.7 (L) %      Monocyte % 7.4 %      Eosinophil % 0.0 %      Basophil % 0.3 %      Immature Grans % 1.2 %      Neutrophils, Absolute 15.82 (H) 10*3/mm3      Lymphocytes, Absolute 2.80 10*3/mm3      Monocytes, Absolute 1.52 (H) 10*3/mm3      Eosinophils, Absolute 0.01 10*3/mm3      Basophils, Absolute 0.06 10*3/mm3      Immature Grans, Absolute 0.25 (H) 10*3/mm3      nRBC 0.1 (H) /100 WBC     aPTT [[id number]]  (Normal) Collected:  03/10/18 1737    Specimen:  Blood Updated:  03/10/18 1752     PTT 33.5 seconds     POC Glucose Once [[id number]]  (Normal) Collected:  03/10/18 1559    Specimen:  Blood Updated:  03/10/18 1619     Glucose 94 mg/dL      Comment: : [id number] Luis Alberto (Bone) ShannonMeter ID: [id number]       Calcium, Ionized [[id number]]  (Abnormal) Collected:  03/10/18 1459    Specimen:  Blood Updated:  03/10/18 1505     Ionized Calcium 3.58 (L) mg/dL      Collected by [id number]    Blood Gas, Arterial [[id number]]  (Abnormal) Collected:  03/10/18 1458    Specimen:  Arterial Blood Updated:  03/10/18 1501     Site Arterial Line     Boo's Test N/A     pH, Arterial 7.142 (C) pH units      pCO2, Arterial 36.3 mm Hg      pO2, Arterial 154.0 (H) mm Hg      HCO3, Arterial 12.4 (L)  mmol/L      Base Excess, Arterial -15.7 (L) mmol/L      O2 Saturation, Arterial 99.0 %      Temperature 34.3 C      Barometric Pressure for Blood Gas 748 mmHg      Modality Ventilator     FIO2 50 %      Ventilator Mode AC     Set Tidal Volume 500     Set [facility] Resp Rate 22.0     PEEP 5.0     Notified Who GORDY SAINI     Notified By [id number]     Notified Time 03/10/2018 15:02     Collected by [id number]    POC Glucose Once [[id number]]  (Normal) Collected:  03/10/18 1422    Specimen:  Blood Updated:  03/10/18 1440     Glucose 101 mg/dL      Comment: : [id number] Luis Alberto (Bone) ShannonMeter ID: [id number]       Influenza Antigen, Rapid - Swab, Nasopharynx [[id number]]  (Normal) Collected:  03/10/18 1405    Specimen:  Swab from Nasopharynx Updated:  03/10/18 1432     Influenza A Ag, EIA Negative     Influenza B Ag, EIA Negative    Narrative:         Recommend confirmation of negative results by viral culture or molecular assay.    CBC & Differential [[id number]] Collected:  03/10/18 1247    Specimen:  Blood Updated:  03/10/18 1409    Narrative:       The following orders were created for panel order CBC & Differential.  Procedure                               Abnormality         Status                     ---------                               -----------         ------                     CBC Auto Differential[[id number]]        Abnormal            Final result                 Please view results for these tests on the individual orders.    CBC Auto Differential [[id number]]  (Abnormal) Collected:  03/10/18 1247    Specimen:  Blood Updated:  03/10/18 1409     WBC 21.17 (H) 10*3/mm3      RBC 5.15 10*6/mm3      Hemoglobin 14.1 g/dL      Hematocrit 46.4 %      MCV 90.1 fL      MCH 27.4 (L) pg      MCHC 30.4 (L) g/dL      RDW 14.7 %      RDW-SD 48.4 fl      MPV 10.0 fL      Platelets 257 10*3/mm3      Neutrophil % 81.2 (H) %      Lymphocyte % 10.6 (L) %      Monocyte % 6.3 %      Eosinophil % 0.0 %      Basophil % 0.2 %       Immature Grans % 1.7 %      Neutrophils, Absolute 17.18 (H) 10*3/mm3      Lymphocytes, Absolute 2.25 10*3/mm3      Monocytes, Absolute 1.34 (H) 10*3/mm3      Eosinophils, Absolute 0.01 10*3/mm3      Basophils, Absolute 0.04 10*3/mm3      Immature Grans, Absolute 0.35 (H) 10*3/mm3      nRBC 0.1 (H) /100 WBC     Troponin [[id number]]  (Abnormal) Collected:  03/10/18 1247    Specimen:  Blood Updated:  03/10/18 1331     Troponin I 0.402 (H) ng/mL     Comprehensive Metabolic Panel [[id number]]  (Abnormal) Collected:  03/10/18 1247    Specimen:  Blood Updated:  03/10/18 1325     Glucose 159 (H) mg/dL      BUN 19 mg/dL      Creatinine 1.38 mg/dL      Sodium 143 mmol/L      Potassium 5.4 (H) mmol/L      Chloride 105 mmol/L      CO2 15.0 (L) mmol/L      Calcium 7.4 (L) mg/dL      Total Protein 6.8 g/dL      Albumin 3.50 g/dL      ALT (SGPT) 88 (H) U/L      AST (SGOT) 128 (H) U/L      Alkaline Phosphatase 72 U/L      Total Bilirubin 1.1 (H) mg/dL      eGFR  African Amer 50 (L) mL/min/1.73      Globulin 3.3 gm/dL      A/G Ratio 1.1 g/dL      BUN/Creatinine Ratio 13.8     Anion Gap 23.0 (H) mmol/L     aPTT [[id number]]  (Normal) Collected:  03/10/18 1248    Specimen:  Blood Updated:  03/10/18 1324     PTT 30.3 seconds     Lactic Acid, Plasma [[id number]]  (Abnormal) Collected:  03/10/18 1247    Specimen:  Blood Updated:  03/10/18 1324     Lactate 10.7 (C) mmol/L     Magnesium [[id number]]  (Abnormal) Collected:  03/10/18 1247    Specimen:  Blood Updated:  03/10/18 1321     Magnesium 2.8 (H) mg/dL     Phosphorus [[id number]]  (Abnormal) Collected:  03/10/18 1247    Specimen:  Blood Updated:  03/10/18 1321     Phosphorus 7.2 (H) mg/dL     POC Glucose Once [[id number]]  (Abnormal) Collected:  03/10/18 1245    Specimen:  Blood Updated:  03/10/18 1313     Glucose 145 (H) mg/dL      Comment: : [id number] [place] (Bone) ShannonMeter ID: [id number]       Lactic Acid, Plasma [[id number]]  (Abnormal) Collected:  03/10/18 1050    Specimen:   Blood Updated:  03/10/18 1137     Lactate 7.4 (C) mmol/L     Blood Gas, Arterial [[id number]]  (Abnormal) Collected:  03/10/18 1111    Specimen:  Arterial Blood Updated:  03/10/18 1115     Site Arterial Line     Boo's Test N/A     pH, Arterial 7.259 (L) pH units      pCO2, Arterial 32.1 (L) mm Hg      pO2, Arterial 302.0 (H) mm Hg      HCO3, Arterial 14.4 (L) mmol/L      Base Excess, Arterial -11.6 (L) mmol/L      O2 Saturation, Arterial >100.1 (H) %      Temperature 37.0 C      Barometric Pressure for Blood Gas 749 mmHg      Modality Ventilator     FIO2 100 %      Ventilator Mode AC     Set Tidal Volume 500     Set Mech Resp Rate 22.0     PEEP 8.0     Collected by [id number]    SCANNED - LABS [[id number]] Resulted:  03/09/18      Updated:  03/10/18 0953          Imaging Results (last 24 hours)     Procedure Component Value Units Date/Time    XR Chest 1 View [[id number]] Collected:  03/11/18 0716     Updated:  03/11/18 0720    Narrative:       EXAMINATION:   XR CHEST 1 VW-  3/11/2018 6:16 AM CST     HISTORY: On ventilator     Frontal upright radiograph of the chest 3/11/2018 4:58 AM CST     COMPARISON: March 10, 2018.     FINDINGS:   There is extensive air space filling infiltrate in the right lung. This  has appearance of pulmonary edema. This is a change from prior exam.  Cardiac silhouettes markedly enlarged. Endotracheal tube and nasogastric  tubes are present..      The osseous structures and surrounding soft tissues demonstrate no acute  abnormality.       Impression:       1. Marked cardiomegaly extensive infiltrate right lung most likely  representing congestive failure..        This report was finalized on 03/11/2018 07:17 by Dr. Freedom Vásquez MD.             Intake/Output Summary (Last 24 hours) at 03/11/18 1004  Last data filed at 03/11/18 0843   Gross per 24 hour   Intake          8973.15 ml   Output             1120 ml   Net          7853.15 ml       Physical Exam    Constitutional: She appears  well-developed and well-nourished.  She is sedated and intubated.   Morbidly obese   HENT:   Head: Normocephalic and atraumatic.   Nose: Nose normal.   Mouth/Throat: Oropharynx is clear and moist.   Endotracheal tube in place   Eyes: Conjunctivae and lids are normal. No scleral icterus.   Pupils 2 mm and fixed   Neck: Neck supple. No JVD present. No tracheal deviation present. No thyromegaly present.   Cardiovascular: Regular rhythm, normal heart sounds and intact distal pulses.  Tachycardia present.    No murmur heard.  Pulmonary/Chest: She is intubated. She has decreased breath sounds in the right lower field and the left lower field. She has coarse bilateral rhonchi and bilateral rales.   Abdominal: Soft. Bowel sounds are normal. She exhibits no distension and no mass.   Abdomen very obese   Musculoskeletal: Normal range of motion. She exhibits edema (3/4 BLE). She exhibits no deformity.   Neurological: She is unresponsive.   Skin: Skin is warm. No rash noted. No erythema.     Results Review:  I have reviewed the labs, radiology results, and diagnostic studies since my last progress note and made treatment changes reflective of the results.   I have reviewed the current medications.    Assessment/Plan     Hospital Problem List     * (Principal)Cardiac arrest    Cardiogenic shock    Anoxic brain injury    Metabolic acidosis    Elevated troponin    Lactic acidosis    Cardiomegaly    Shock liver    SEAMUS (acute kidney injury)    Ventricular tachycardia    Hypertension          PLAN:  Initiation of tube feedings not being considered at present due to cardiogenic shock with hypotension requuiring pressors and concerns for fluid overload  Lasix 60 mg IV ×1  Will defer further use of diuretics to cardiology.  Bolus bicarbonate 100 mEq ×1 this morning  Neurology consultation regarding anoxic brain injury    Thierno Winchester DO   03/11/18   10:04 AM     Approximately 50 minutes of critical care time were spent managing  the patient exclusive of billable procedures.     Addendum: The patient's daughter has decided to change code status to Conditional and is further considering comfort measures.    Thierno Winchester DO  03/11/18  10:41 AM

## 2018-03-11 NOTE — PLAN OF CARE
Problem: Fall Risk (Adult)  Goal: Identify Related Risk Factors and Signs and Symptoms  Outcome: Ongoing (interventions implemented as appropriate)    Goal: Absence of Fall  Outcome: Ongoing (interventions implemented as appropriate)      Problem: Patient Care Overview  Goal: Plan of Care Review  Outcome: Ongoing (interventions implemented as appropriate)   03/11/18 8352   Plan of Care Review   Progress no change   OTHER   Outcome Summary pt rewaarming from hypothermia protocol, pt is a AND aggressive at this time, when body temp increases to 96 degrees daughter and family ready for pt to be comfort care. b/p 's, sinus rhythm with pvc's   Coping/Psychosocial   Plan of Care Reviewed With patient;daughter;family       Problem: Skin Injury Risk (Adult)  Goal: Identify Related Risk Factors and Signs and Symptoms  Outcome: Ongoing (interventions implemented as appropriate)    Goal: Skin Health and Integrity  Outcome: Ongoing (interventions implemented as appropriate)      Problem: Hypothermia, Therapeutic (Adult)  Goal: Signs and Symptoms of Listed Potential Problems Will be Absent, Minimized or Managed (Hypothermia, Therapeutic)  Outcome: Ongoing (interventions implemented as appropriate)      Problem: Ventilation, Mechanical Invasive (Adult)  Goal: Signs and Symptoms of Listed Potential Problems Will be Absent, Minimized or Managed (Ventilation, Mechanical Invasive)  Outcome: Ongoing (interventions implemented as appropriate)

## 2018-03-11 NOTE — PLAN OF CARE
Problem: Fall Risk (Adult)  Goal: Identify Related Risk Factors and Signs and Symptoms  Outcome: Ongoing (interventions implemented as appropriate)    Goal: Absence of Fall  Outcome: Ongoing (interventions implemented as appropriate)      Problem: Patient Care Overview  Goal: Plan of Care Review  Outcome: Ongoing (interventions implemented as appropriate)   03/10/18 1850   Plan of Care Review   Progress no change   OTHER   Outcome Summary b/p 's, remains bg levophed and roman gtts, rhythm sinus to st with pvcs 's, remains intubated and sedated on hypothermia protocol, bg 's, lactic acid up to 11.7, poor EF   Coping/Psychosocial   Plan of Care Reviewed With patient  (have spoken with daughter Tamar several times today )       Problem: Skin Injury Risk (Adult)  Goal: Identify Related Risk Factors and Signs and Symptoms  Outcome: Ongoing (interventions implemented as appropriate)    Goal: Skin Health and Integrity  Outcome: Ongoing (interventions implemented as appropriate)      Problem: Hypothermia, Therapeutic (Adult)  Goal: Signs and Symptoms of Listed Potential Problems Will be Absent, Minimized or Managed (Hypothermia, Therapeutic)  Outcome: Ongoing (interventions implemented as appropriate)      Problem: Ventilation, Mechanical Invasive (Adult)  Goal: Signs and Symptoms of Listed Potential Problems Will be Absent, Minimized or Managed (Ventilation, Mechanical Invasive)  Outcome: Ongoing (interventions implemented as appropriate)         Health system Smokers Quitline ([id number])

## 2018-03-11 NOTE — PROGRESS NOTES
PULMONARY AND CRITICAL CARE PROGRESS NOTE - [facility]    Patient: Bryanna Telles    1970    MR# [id number]    Acct# [id number]  03/11/18   9:04 AM  Referring Provider: Thierno Winchester DO    Chief Complaint: Mechanically ventilated, status post cardiac arrest    Interval history: The rewarming phase began at 8:25 morning.  She is now requiring 3 pressors and has had some arrhythmias overnight.  In the last hour there has been arya bleeding from her ET tube.  Today's chest x-ray shows worsening congestive heart failure, white blood count is up to 20 and lactic acid remains elevated.  She continues to have metabolic acidosis with hypoxemia this morning.  According to the bedside nurse is there is a plan for a family meeting later today and the patient's daughter is leaning towards making her a DO NOT RESUSCITATE.  Reviewed the official echo report that showed dilated cardiomyopathy and an EF of only 26%.    Meds:    artificial tears  Both Eyes Q2H   enoxaparin 40 mg Subcutaneous Q24H   furosemide 60 mg Intravenous Once   hydrALAZINE 20 mg Intravenous Once   ipratropium-albuterol 3 mL Nebulization 4x Daily - RT   pantoprazole 40 mg Intravenous Q AM   piperacillin-tazobactam 4.5 g Intravenous Q8H   IVPB builder  Intravenous Q24H       DOPamine 2-20 mcg/kg/min Last Rate: 2 mcg/kg/min (03/11/18 0329)   EPINEPHrine 0.02-0.3 mcg/kg/min    fentanyl 10 mcg/mL  mcg/hr Last Rate: 50 mcg/hr (03/10/18 2315)   insulin regular infusion 1 unit/mL 1-20 Units/hr    norepinephrine 0.02-0.3 mcg/kg/min Last Rate: 0.3 mcg/kg/min (03/10/18 2129)   phenylephrine 0.5-3 mcg/kg/min Last Rate: 2 mcg/kg/min (03/11/18 0820)   propofol 5-50 mcg/kg/min Last Rate: 35 mcg/kg/min (03/11/18 9184)   sodium bicarbonate drip (greater than 75 mEq/bag) 75 mL/hr Last Rate: 75 mL/hr (03/10/18 1412)   sodium chloride  Last Rate: 999 mL/hr (03/10/18 4172)   vecuronium (NORCURON) infusion 1 mcg/kg/min      Review of Systems:    Cannot obtain due to mechanical ventilation.  The patient notably is critically ill and connected to a ventilator.  As such patient cannot communicate and provide any history whatsoever, including any history of present illness or interval history since arrival or review of systems. The interested reviewer may note this fact, as an attempt has been made at collecting and documenting these portions of the patient history, but this information is unobtainable despite attempted review and therefore cannot be documented at this time.     Ventilator Settings:  Vent Mode: VC+/AC  Vt (Set, L): 0.5 L  Resp Rate (Set): 22     FiO2 (%): 40 %  PEEP/CPAP (cm H2O): 5 cm H20  Minute Ventilation (L/min) (Obs): 11.5 L/min  Resp Rate (Observed) Vent: 22     I:E Ratio (Obs): 1:2.6  PIP Observed (cm H2O): 41 cm H2O     Physical Exam:  Temp:  [93 °F (33.9 °C)-94.3 °F (34.6 °C)] 93.3 °F (34.1 °C)  Heart Rate:  [] 87  Resp:  [22] 22  BP: ()/() 132/97  Arterial Line BP: ()/() 118/93  FiO2 (%):  [40 %-50 %] 40 %  Intake/Output Summary (Last 24 hours) at 03/11/18 0904  Last data filed at 03/11/18 0843   Gross per 24 hour   Intake          8973.15 ml   Output             1120 ml   Net          7853.15 ml     Physical Exam   Constitutional: She appears well-developed and well-nourished. She is sedated and intubated.   obese   HENT: ET tube in place with bright blood noted in tubing.  Head: Normocephalic and atraumatic.   Nose: Normal.  Eyes: No scleral icterus.   Pupils fixed and dilated    Neck: Neck supple.   Neck is thick   Cardiovascular: Regular rate and rhythm at present.  PVCs noted   Pulmonary/Chest: She is intubated. She has rhonchi (Course sounding).   Abdominal: Soft. She exhibits no distension.   Obese   Musculoskeletal: She exhibits edema (Bilateral lower extremities).   Neurological: She is unresponsive.   Skin: Skin is dry.   Cool, hypothermia protocol in progress, currently in rewarming  phase    Results from last 7 days  Lab Units 03/11/18  0637 03/10/18  2353 03/10/18  1736   WBC 10*3/mm3 20.47* 17.92* 20.46*   HEMOGLOBIN g/dL 14.7 14.4 14.6   PLATELETS 10*3/mm3 232 220 249       Results from last 7 days  Lab Units 03/11/18  0637 03/10/18  2354 03/10/18  1737   SODIUM mmol/L 145 147* 144   POTASSIUM mmol/L 4.1 3.8 4.4   BUN mg/dL 22* 21 20   CREATININE mg/dL 1.83* 1.57* 1.58*       Results from last 7 days  Lab Units 03/11/18  0408 03/10/18  1845 03/10/18  1458   PH, ARTERIAL pH units 7.143* 7.118* 7.142*   PCO2, ARTERIAL mm Hg 38.2 34.8* 36.3   PO2 ART mm Hg 71.9* 139.0* 154.0*   FIO2 % 40 40 50     Blood Culture   Date Value Ref Range Status   03/10/2018 No growth at less than 24 hours  Preliminary   03/10/2018 No growth at less than 24 hours  Preliminary     Urine Culture   Date Value Ref Range Status   03/09/2018 No growth at 2 days  Final     Respiratory Culture   Date Value Ref Range Status   03/10/2018 Scant growth (1+) Normal Respiratory Ava  Preliminary     Recent films:  Imaging Results (last 24 hours)     Procedure Component Value Units Date/Time    XR Chest 1 View [[id number]] Collected:  03/11/18 0716     Updated:  03/11/18 0720    Narrative:       EXAMINATION:   XR CHEST 1 VW-  3/11/2018 6:16 AM CST     HISTORY: On ventilator     Frontal upright radiograph of the chest 3/11/2018 4:58 AM CST     COMPARISON: March 10, 2018.     FINDINGS:   There is extensive air space filling infiltrate in the right lung. This  has appearance of pulmonary edema. This is a change from prior exam.  Cardiac silhouettes markedly enlarged. Endotracheal tube and nasogastric  tubes are present..      The osseous structures and surrounding soft tissues demonstrate no acute  abnormality.       Impression:       1. Marked cardiomegaly extensive infiltrate right lung most likely  representing congestive failure..        This report was finalized on 03/11/2018 07:17 by Dr. Freedom Vásquez MD.        Films reviewed  personally by me.  My interpretation: Worsening congestive heart failure with significant cardiomegaly, ET tube in place    2D ECHO:  · The left ventricular cavity is moderately dilated.  · Left ventricular wall thickness is consistent with mild concentric hypertrophy.  · Mild to moderate tricuspid valve regurgitation is present.  · Calculated right ventricular systolic pressure from tricuspid regurgitation is 22 mmHg.  · The findings are consistent with dilated cardiomyopathy.  · Left ventricular systolic function is severely decreased.  · Left atrial cavity size is mildly dilated.  · Right ventricular cavity is mildly dilated.     DILATED CARDIOMYOPATHY - FOUR CHAMBER ENLARGEMENT  SEVERE GLOBAL LV HYPOKINESIS  MILD TO MODERATE TR WITHOUT PULMONARY HTN        Pulmonary Assessment:  1. Acute respiratory failure requiring mechanical ventilation secondary to cardiac arrest with cardiogenic shock  2. Dilated cardiomyopathy, current EF 26%  3. Morbid obesity with BMI greater then 60  4. Lactic acidosis  5. Metabolic acidosis  6. Leukocytosis  7. Hypotension, requiring multiple pressors      Recommend:   · FiO2 increased to 50%, continue current vent settings otherwise  · Sputum in progress  · Blood cultures without growth  · Flu negative  · Echo results reviewed and noted  · Continue current hypothermia protocol    Electronically signed by SB Benítez on 3/11/2018 at 9:04 AM  Physician substantive contribution:  Pertinent symptoms/interval history include: The rewarming process is ongoing.  Respiratory exam shows pertinent findings of coarse rhonchi on chest exam.  Plan includes: We will continue ventilatory support pending further input from family members regarding goals of care once she has been rewarmed and her neurologic status can be better assessed.  I have seen and examined patient personally, performing a face-to-face diagnostic evaluation with plan of care reviewed and developed with SB and  nursing staff. I have addended and/or modified the above history of present illness, physical examination, and assessment and plan to reflect my findings and impressions. Essential elements of the care plan were discussed with APRN above.  Agree with findings and assessment/plan as documented above.    Electronically signed by Barak Ramirez MD, on 3/11/2018, 3:33 PM

## 2018-03-11 NOTE — CONSULTS
Patient Care Team:  SB Michel as PCP - General (Family Medicine)  Sanchez Bates MD  REASON FOR REFERRAL: SEVERE DILATED CARDIOMYOPATHY  Chief complaint  WITNESSED OUT OF HOSPITAL CARDIAC ARREST    Subjective     Patient is a 47 y.o. female presents in extremis and has been resuscitated with RSP and initiation of THT. She is now in the rewarming phase. There is not much PMH to go on, but the family has related that she had been told of a large heart. Eval since admit reveals significant cardiomegaly and very poor LVEF at 26%. She has evidence of MOORE and her neuro status is uncertain at this time.          Review of Systems   Review of Systems   Unable to perform ROS: Acuity of condition       History  Past Medical History:   Diagnosis Date   • Hypertension    • Sleep apnea      Past Surgical History:   Procedure Laterality Date   • HYSTERECTOMY       History reviewed. No pertinent family history.  Social History   Substance Use Topics   • Smoking status: Never Smoker   • Smokeless tobacco: Not on file   • Alcohol use Defer     No prescriptions prior to admission.     Allergies:  Review of patient's allergies indicates no known allergies.    Objective     Vital Signs  Temp:  [93 °F (33.9 °C)-94.3 °F (34.6 °C)] 93.3 °F (34.1 °C)  Heart Rate:  [] 88  Resp:  [22] 22  BP: ()/() 136/99  Arterial Line BP: ()/() 125/96  FiO2 (%):  [40 %-50 %] 50 %    Physical Exam:   Physical Exam   Constitutional:   Morbidly obese   HENT:   Head: Normocephalic.   Cardiovascular: Normal rate, regular rhythm and normal heart sounds.  Exam reveals no gallop and no friction rub.    No murmur heard.  Pulmonary/Chest:   Totally vent controlled   Abdominal: Soft.   Neurological:   unresoponsive   Skin: Skin is warm and dry.     Results Review:      Lab Results (last 72 hours)     Procedure Component Value Units Date/Time    POC Glucose Once [[id number]]  (Abnormal) Collected:  03/11/18 0839     Specimen:  Blood Updated:  03/11/18 0858     Glucose 133 (H) mg/dL      Comment: : [id number] Luis Alberto (Bone) ShannonMeter ID: [id number]       Respiratory Culture - Sputum, Bronchus [[id number]] Collected:  03/10/18 1626    Specimen:  Sputum from Bronchus Updated:  03/11/18 0753     Respiratory Culture --      Scant growth (1+) Normal Respiratory Ava    CBC & Differential [[id number]] Collected:  03/11/18 0637    Specimen:  Blood Updated:  03/11/18 0737    Narrative:       The following orders were created for panel order CBC & Differential.  Procedure                               Abnormality         Status                     ---------                               -----------         ------                     CBC Auto Differential[[id number]]        Abnormal            Final result                 Please view results for these tests on the individual orders.    CBC Auto Differential [[id number]]  (Abnormal) Collected:  03/11/18 0637    Specimen:  Blood Updated:  03/11/18 0737     WBC 20.47 (H) 10*3/mm3      RBC 5.36 10*6/mm3      Hemoglobin 14.7 g/dL      Hematocrit 48.5 (H) %      MCV 90.5 fL      MCH 27.4 (L) pg      MCHC 30.3 (L) g/dL      RDW 15.2 (H) %      RDW-SD 49.9 fl      MPV 9.9 fL      Platelets 232 10*3/mm3      Neutrophil % 78.2 (H) %      Lymphocyte % 13.7 (L) %      Monocyte % 7.4 %      Eosinophil % 0.0 %      Basophil % 0.1 %      Immature Grans % 0.6 %      Neutrophils, Absolute 16.00 (H) 10*3/mm3      Lymphocytes, Absolute 2.80 10*3/mm3      Monocytes, Absolute 1.51 (H) 10*3/mm3      Eosinophils, Absolute 0.00 10*3/mm3      Basophils, Absolute 0.03 10*3/mm3      Immature Grans, Absolute 0.13 (H) 10*3/mm3      nRBC 0.1 (H) /100 WBC     Lactic Acid, Plasma [[id number]]  (Abnormal) Collected:  03/11/18 0636    Specimen:  Blood Updated:  03/11/18 0728     Lactate 10.8 (C) mmol/L     Urine Culture - Urine, Urine, Clean Catch [[id number]]  (Normal) Collected:  03/09/18 1907    Specimen:  Urine from  Urine, Catheter Updated:  03/11/18 0710     Urine Culture No growth at 2 days    Comprehensive Metabolic Panel [[id number]]  (Abnormal) Collected:  03/11/18 0637    Specimen:  Blood Updated:  03/11/18 0708     Glucose 134 (H) mg/dL      BUN 22 (H) mg/dL      Comment: Specimen hemolyzed. Results may be affected.        Creatinine 1.83 (H) mg/dL      Sodium 145 mmol/L      Potassium 4.1 mmol/L      Chloride 105 mmol/L      CO2 14.0 (L) mmol/L      Calcium 7.2 (L) mg/dL      Total Protein 6.5 g/dL      Albumin 3.40 (L) g/dL      ALT (SGPT) 337 (H) U/L      AST (SGOT) 533 (H) U/L      Alkaline Phosphatase 61 U/L      Total Bilirubin 1.6 (H) mg/dL      eGFR   Amer 36 (L) mL/min/1.73      Globulin 3.1 gm/dL      A/G Ratio 1.1 g/dL      BUN/Creatinine Ratio 12.0     Anion Gap 26.0 (H) mmol/L     Magnesium [[id number]]  (Abnormal) Collected:  03/11/18 0637    Specimen:  Blood Updated:  03/11/18 0704     Magnesium 2.3 (H) mg/dL     Phosphorus [[id number]]  (Abnormal) Collected:  03/11/18 0637    Specimen:  Blood Updated:  03/11/18 0704     Phosphorus 8.6 (H) mg/dL     aPTT [[id number]]  (Normal) Collected:  03/11/18 0637    Specimen:  Blood Updated:  03/11/18 0658     PTT 33.1 seconds     POC Glucose Once [[id number]]  (Normal) Collected:  03/11/18 0619    Specimen:  Blood Updated:  03/11/18 0631     Glucose 120 mg/dL      Comment: : [id number] Juan Carlos AndersonferMeter ID: [id number]       Calcium, Ionized [[id number]]  (Abnormal) Collected:  03/11/18 0620    Specimen:  Blood Updated:  03/11/18 0622     Ionized Calcium 3.38 (L) mg/dL      Collected by [id number]    Blood Gas, Arterial [[id number]]  (Abnormal) Collected:  03/11/18 0408    Specimen:  Arterial Blood Updated:  03/11/18 0432     Site Arterial Line     Boo's Test N/A     pH, Arterial 7.143 (C) pH units      pCO2, Arterial 38.2 mm Hg      pO2, Arterial 71.9 (L) mm Hg      HCO3, Arterial 13.1 (L) mmol/L      Base Excess, Arterial -15.2 (L) mmol/L      O2 Saturation,  Arterial 91.0 (L) %      Temperature 37.0 C      Barometric Pressure for Blood Gas 749 mmHg      Modality Ventilator     FIO2 40 %      Ventilator Mode AC     Set Tidal Volume 500     Set Mech Resp Rate 22.0     PEEP 5.0     Notified [facility] [id number]     Notified By [id number]     Notified Time 03/11/2018 04:21     Collected by [id number]    POC Glucose Once [[id number]]  (Normal) Collected:  03/11/18 0408    Specimen:  Blood Updated:  03/11/18 0422     Glucose 122 mg/dL      Comment: : [id number] Juan Carlos ConnollyniferMeter ID: [id number]       POC Glucose Once [[id number]]  (Normal) Collected:  03/11/18 0320    Specimen:  Blood Updated:  03/11/18 0332     Glucose 99 mg/dL      Comment: : [id number] Juan Carlos JenniferMeter ID: [id number]       Lactic Acid, Plasma [[id number]]  (Abnormal) Collected:  03/10/18 2353    Specimen:  Blood Updated:  03/11/18 0051     Lactate 10.9 (C) mmol/L     Calcium, Ionized [[id number]]  (Abnormal) Collected:  03/11/18 0026    Specimen:  Blood Updated:  03/11/18 0038     Ionized Calcium 3.75 (L) mg/dL      Collected by [id number]    Troponin [[id number]]  (Abnormal) Collected:  03/10/18 2354    Specimen:  Blood Updated:  03/11/18 0028     Troponin I 0.409 (H) ng/mL     CBC & Differential [[id number]] Collected:  03/10/18 2353    Specimen:  Blood Updated:  03/11/18 0020    Narrative:       The following orders were created for panel order CBC & Differential.  Procedure                               Abnormality         Status                     ---------                               -----------         ------                     CBC Auto Differential[[id number]]        Abnormal            Final result                 Please view results for these tests on the individual orders.    CBC Auto Differential [[id number]]  (Abnormal) Collected:  03/10/18 2353    Specimen:  Blood Updated:  03/11/18 0020     WBC 17.92 (H) 10*3/mm3      RBC 5.23 10*6/mm3      Hemoglobin 14.4 g/dL      Hematocrit 47.4 (H) %      MCV 90.6 fL       MCH 27.5 (L) pg      MCHC 30.4 (L) g/dL      RDW 15.0 %      RDW-SD 49.8 fl      MPV 9.6 fL      Platelets 220 10*3/mm3      Neutrophil % 80.8 (H) %      Lymphocyte % 12.7 (L) %      Monocyte % 4.9 %      Eosinophil % 0.1 %      Basophil % 0.3 %      Immature Grans % 1.2 %      Neutrophils, Absolute 14.49 (H) 10*3/mm3      Lymphocytes, Absolute 2.28 10*3/mm3      Monocytes, Absolute 0.87 10*3/mm3      Eosinophils, Absolute 0.01 10*3/mm3      Basophils, Absolute 0.05 10*3/mm3      Immature Grans, Absolute 0.22 (H) 10*3/mm3      nRBC 0.2 (H) /100 WBC     Comprehensive Metabolic Panel [[id number]]  (Abnormal) Collected:  03/10/18 2354    Specimen:  Blood Updated:  03/11/18 0017     Glucose 159 (H) mg/dL      BUN 21 mg/dL      Creatinine 1.57 (H) mg/dL      Sodium 147 (H) mmol/L      Potassium 3.8 mmol/L      Chloride 106 mmol/L      CO2 15.0 (L) mmol/L      Calcium 7.1 (L) mg/dL      Total Protein 6.2 (L) g/dL      Albumin 3.20 (L) g/dL      ALT (SGPT) 164 (H) U/L      AST (SGOT) 271 (H) U/L      Alkaline Phosphatase 66 U/L      Total Bilirubin 1.6 (H) mg/dL      eGFR   Amer 43 (L) mL/min/1.73      Globulin 3.0 gm/dL      A/G Ratio 1.1 g/dL      BUN/Creatinine Ratio 13.4     Anion Gap 26.0 (H) mmol/L     Phosphorus [[id number]]  (Abnormal) Collected:  03/10/18 2354    Specimen:  Blood Updated:  03/11/18 0017     Phosphorus 8.0 (H) mg/dL     Magnesium [[id number]]  (Abnormal) Collected:  03/10/18 2354    Specimen:  Blood Updated:  03/11/18 0017     Magnesium 2.3 (H) mg/dL     aPTT [[id number]]  (Normal) Collected:  03/10/18 2354    Specimen:  Blood Updated:  03/11/18 0012     PTT 34.3 seconds     POC Glucose Once [[id number]]  (Normal) Collected:  03/10/18 2351    Specimen:  Blood Updated:  03/11/18 0002     Glucose 98 mg/dL      Comment: : [id number] Juan Carlos FairchildMeter ID: [id number]       Blood Culture With IVAN - Blood, [[id number]]  (Normal) Collected:  03/10/18 1026    Specimen:  Blood from Blood, Arterial  Line Updated:  03/10/18 2331     Blood Culture No growth at less than 24 hours    Blood Culture With IVAN - Blood, [[id number]]  (Normal) Collected:  03/10/18 1041    Specimen:  Blood from Blood, Central Line Updated:  03/10/18 2331     Blood Culture No growth at less than 24 hours    POC Glucose Once [[id number]]  (Normal) Collected:  03/10/18 2201    Specimen:  Blood Updated:  03/10/18 2212     Glucose 78 mg/dL      Comment: : [id number] Juan Carlos JenniferMeter ID: [id number]       POC Glucose Once [[id number]]  (Abnormal) Collected:  03/10/18 2039    Specimen:  Blood Updated:  03/10/18 2050     Glucose 59 (L) mg/dL      Comment: : [id number] Juan Carlos JenniferMeter ID: [id number]       Blood Gas, Arterial [[id number]]  (Abnormal) Collected:  03/10/18 1845    Specimen:  Arterial Blood Updated:  03/10/18 1858     Site Arterial Line     Boo's Test N/A     pH, Arterial 7.118 (C) pH units      pCO2, Arterial 34.8 (L) mm Hg      pO2, Arterial 139.0 (H) mm Hg      HCO3, Arterial 11.2 (L) mmol/L      Base Excess, Arterial -17.2 (L) mmol/L      O2 Saturation, Arterial 98.5 %      Temperature 37.0 C      Barometric Pressure for Blood Gas 749 mmHg      Modality Ventilator     FIO2 40 %      Ventilator Mode AC     Set Tidal Volume 500     Set Mech Resp Rate 22.0     PEEP 5.0     Notified [facility] [id number]     Notified By [id number]     Notified Time 03/10/2018 18:58     Collected by [id number]    Calcium, Ionized [[id number]]  (Abnormal) Collected:  03/10/18 1839    Specimen:  Blood Updated:  03/10/18 1849     Ionized Calcium 3.76 (L) mg/dL      Collected by [id number]    Lactic Acid, Plasma [[id number]]  (Abnormal) Collected:  03/10/18 1736    Specimen:  Blood Updated:  03/10/18 1823     Lactate 11.7 (C) mmol/L     Troponin [[id number]]  (Abnormal) Collected:  03/10/18 1737    Specimen:  Blood Updated:  03/10/18 1807     Troponin I 0.466 (H) ng/mL     Magnesium [[id number]]  (Abnormal) Collected:  03/10/18 1737    Specimen:  Blood Updated:  03/10/18  1757     Magnesium 2.7 (H) mg/dL      Comment: Specimen hemolyzed.  Results may be affected.       Phosphorus [[id number]]  (Abnormal) Collected:  03/10/18 1737    Specimen:  Blood Updated:  03/10/18 1757     Phosphorus 8.6 (H) mg/dL      Comment: Specimen hemolyzed.  Results may be affected.       Comprehensive Metabolic Panel [[id number]]  (Abnormal) Collected:  03/10/18 1737    Specimen:  Blood Updated:  03/10/18 1756     Glucose 95 mg/dL      BUN 20 mg/dL      Comment: Specimen hemolyzed. Results may be affected.        Creatinine 1.58 (H) mg/dL      Sodium 144 mmol/L      Potassium 4.4 mmol/L      Comment: Specimen hemolyzed.  Results may be affected.        Chloride 106 mmol/L      CO2 11.0 (L) mmol/L      Calcium 7.1 (L) mg/dL      Total Protein 6.7 g/dL      Albumin 3.50 g/dL      ALT (SGPT) 86 (H) U/L      Comment: Specimen hemolyzed.  Results may be affected.        AST (SGOT) 166 (H) U/L      Comment: Specimen hemolyzed.  Results may be affected.        Alkaline Phosphatase 70 U/L      Comment: Specimen hemolyzed. Results may be affected.        Total Bilirubin 1.3 (H) mg/dL      eGFR   Amer 43 (L) mL/min/1.73      Globulin 3.2 gm/dL      A/G Ratio 1.1 g/dL      BUN/Creatinine Ratio 12.7     Anion Gap 27.0 (H) mmol/L     POC Glucose Once [[id number]]  (Normal) Collected:  03/10/18 1734    Specimen:  Blood Updated:  03/10/18 1754     Glucose 89 mg/dL      Comment: : [id number] Luis Alberto (Bone) ShannonMeter ID: [id number]       CBC & Differential [[id number]] Collected:  03/10/18 1736    Specimen:  Blood Updated:  03/10/18 1752    Narrative:       The following orders were created for panel order CBC & Differential.  Procedure                               Abnormality         Status                     ---------                               -----------         ------                     CBC Auto Differential[[id number]]        Abnormal            Final result                 Please view results for these  tests on the individual orders.    CBC Auto Differential [[id number]]  (Abnormal) Collected:  03/10/18 1736    Specimen:  Blood Updated:  03/10/18 1752     WBC 20.46 (H) 10*3/mm3      RBC 5.38 10*6/mm3      Hemoglobin 14.6 g/dL      Hematocrit 49.2 (H) %      MCV 91.4 fL      MCH 27.1 (L) pg      MCHC 29.7 (L) g/dL      RDW 15.2 (H) %      RDW-SD 50.4 fl      MPV 9.7 fL      Platelets 249 10*3/mm3      Neutrophil % 77.4 %      Lymphocyte % 13.7 (L) %      Monocyte % 7.4 %      Eosinophil % 0.0 %      Basophil % 0.3 %      Immature Grans % 1.2 %      Neutrophils, Absolute 15.82 (H) 10*3/mm3      Lymphocytes, Absolute 2.80 10*3/mm3      Monocytes, Absolute 1.52 (H) 10*3/mm3      Eosinophils, Absolute 0.01 10*3/mm3      Basophils, Absolute 0.06 10*3/mm3      Immature Grans, Absolute 0.25 (H) 10*3/mm3      nRBC 0.1 (H) /100 WBC     aPTT [[id number]]  (Normal) Collected:  03/10/18 1737    Specimen:  Blood Updated:  03/10/18 1752     PTT 33.5 seconds     POC Glucose Once [[id number]]  (Normal) Collected:  03/10/18 1559    Specimen:  Blood Updated:  03/10/18 1619     Glucose 94 mg/dL      Comment: : [id number] Luis Alberto (Bone) ShannonMeter ID: [id number]       Calcium, Ionized [[id number]]  (Abnormal) Collected:  03/10/18 1459    Specimen:  Blood Updated:  03/10/18 1505     Ionized Calcium 3.58 (L) mg/dL      Collected by [id number]    Blood Gas, Arterial [[id number]]  (Abnormal) Collected:  03/10/18 1458    Specimen:  Arterial Blood Updated:  03/10/18 1501     Site Arterial Line     Boo's Test N/A     pH, Arterial 7.142 (C) pH units      pCO2, Arterial 36.3 mm Hg      pO2, Arterial 154.0 (H) mm Hg      HCO3, Arterial 12.4 (L) mmol/L      Base Excess, Arterial -15.7 (L) mmol/L      O2 Saturation, Arterial 99.0 %      Temperature 34.3 C      Barometric Pressure for Blood Gas 748 mmHg      Modality Ventilator     FIO2 50 %      Ventilator Mode AC     Set Tidal Volume 500     Set Mech Resp Rate 22.0     PEEP 5.0     Notified Who  GORDY SAINI     Notified By [id number]     Notified Time 03/10/2018 15:02     Collected by [id number]    POC Glucose Once [[id number]]  (Normal) Collected:  03/10/18 1422    Specimen:  Blood Updated:  03/10/18 1440     Glucose 101 mg/dL      Comment: : [id number] Luis Alberto (Bone) ShannonMeter ID: [id number]       Influenza Antigen, Rapid - Swab, Nasopharynx [[id number]]  (Normal) Collected:  03/10/18 1405    Specimen:  Swab from Nasopharynx Updated:  03/10/18 1432     Influenza A Ag, EIA Negative     Influenza B Ag, EIA Negative    Narrative:         Recommend confirmation of negative results by viral culture or molecular assay.    CBC & Differential [[id number]] Collected:  03/10/18 1247    Specimen:  Blood Updated:  03/10/18 1409    Narrative:       The following orders were created for panel order CBC & Differential.  Procedure                               Abnormality         Status                     ---------                               -----------         ------                     CBC Auto Differential[[id number]]        Abnormal            Final result                 Please view results for these tests on the individual orders.    CBC Auto Differential [[id number]]  (Abnormal) Collected:  03/10/18 1247    Specimen:  Blood Updated:  03/10/18 1409     WBC 21.17 (H) 10*3/mm3      RBC 5.15 10*6/mm3      Hemoglobin 14.1 g/dL      Hematocrit 46.4 %      MCV 90.1 fL      MCH 27.4 (L) pg      MCHC 30.4 (L) g/dL      RDW 14.7 %      RDW-SD 48.4 fl      MPV 10.0 fL      Platelets 257 10*3/mm3      Neutrophil % 81.2 (H) %      Lymphocyte % 10.6 (L) %      Monocyte % 6.3 %      Eosinophil % 0.0 %      Basophil % 0.2 %      Immature Grans % 1.7 %      Neutrophils, Absolute 17.18 (H) 10*3/mm3      Lymphocytes, Absolute 2.25 10*3/mm3      Monocytes, Absolute 1.34 (H) 10*3/mm3      Eosinophils, Absolute 0.01 10*3/mm3      Basophils, Absolute 0.04 10*3/mm3      Immature Grans, Absolute 0.35 (H) 10*3/mm3      nRBC 0.1 (H) /100 WBC      Troponin [[id number]]  (Abnormal) Collected:  03/10/18 1247    Specimen:  Blood Updated:  03/10/18 1331     Troponin I 0.402 (H) ng/mL     Comprehensive Metabolic Panel [[id number]]  (Abnormal) Collected:  03/10/18 1247    Specimen:  Blood Updated:  03/10/18 1325     Glucose 159 (H) mg/dL      BUN 19 mg/dL      Creatinine 1.38 mg/dL      Sodium 143 mmol/L      Potassium 5.4 (H) mmol/L      Chloride 105 mmol/L      CO2 15.0 (L) mmol/L      Calcium 7.4 (L) mg/dL      Total Protein 6.8 g/dL      Albumin 3.50 g/dL      ALT (SGPT) 88 (H) U/L      AST (SGOT) 128 (H) U/L      Alkaline Phosphatase 72 U/L      Total Bilirubin 1.1 (H) mg/dL      eGFR  African Amer 50 (L) mL/min/1.73      Globulin 3.3 gm/dL      A/G Ratio 1.1 g/dL      BUN/Creatinine Ratio 13.8     Anion Gap 23.0 (H) mmol/L     aPTT [[id number]]  (Normal) Collected:  03/10/18 1248    Specimen:  Blood Updated:  03/10/18 1324     PTT 30.3 seconds     Lactic Acid, Plasma [[id number]]  (Abnormal) Collected:  03/10/18 1247    Specimen:  Blood Updated:  03/10/18 1324     Lactate 10.7 (C) mmol/L     Magnesium [[id number]]  (Abnormal) Collected:  03/10/18 1247    Specimen:  Blood Updated:  03/10/18 1321     Magnesium 2.8 (H) mg/dL     Phosphorus [[id number]]  (Abnormal) Collected:  03/10/18 1247    Specimen:  Blood Updated:  03/10/18 1321     Phosphorus 7.2 (H) mg/dL     POC Glucose Once [[id number]]  (Abnormal) Collected:  03/10/18 1245    Specimen:  Blood Updated:  03/10/18 1313     Glucose 145 (H) mg/dL      Comment: : [id number] Luis Alberto (Bone) ShannonMeter ID: [id number]       Lactic Acid, Plasma [[id number]]  (Abnormal) Collected:  03/10/18 1050    Specimen:  Blood Updated:  03/10/18 1137     Lactate 7.4 (C) mmol/L     Blood Gas, Arterial [[id number]]  (Abnormal) Collected:  03/10/18 1111    Specimen:  Arterial Blood Updated:  03/10/18 1115     Site Arterial Line     Boo's Test N/A     pH, Arterial 7.259 (L) pH units      pCO2, Arterial 32.1 (L) mm Hg      pO2,  Arterial 302.0 (H) mm Hg      HCO3, Arterial 14.4 (L) mmol/L      Base Excess, Arterial -11.6 (L) mmol/L      O2 Saturation, Arterial >100.1 (H) %      Temperature 37.0 C      Barometric Pressure for Blood Gas 749 mmHg      Modality Ventilator     FIO2 100 %      Ventilator Mode AC     Set Tidal Volume 500     Set Mech Resp Rate 22.0     PEEP 8.0     Collected by [id number]    SCANNED - LABS [[id number]] Resulted:  03/09/18      Updated:  03/10/18 0953    Blood Gas, Arterial [[id number]]  (Abnormal) Collected:  03/10/18 0830    Specimen:  Arterial Blood Updated:  03/10/18 0834     Site Arterial Line     Boo's Test N/A     pH, Arterial 7.082 (C) pH units      pCO2, Arterial 63.5 (H) mm Hg      pO2, Arterial 147.0 (H) mm Hg      HCO3, Arterial 18.9 (L) mmol/L      Base Excess, Arterial -11.9 (L) mmol/L      O2 Saturation, Arterial 98.1 %      Temperature 37.0 C      Barometric Pressure for Blood Gas 749 mmHg      Modality Ventilator     FIO2 100 %      Ventilator Mode AC     Set Tidal Volume 500     Set Mech Resp Rate 14.0     PEEP 5.0     Notified Who [id number]     Notified By [id number]     Notified Time 03/10/2018 08:34     Collected by [id number]    CBC & Differential [[id number]] Collected:  03/10/18 0700    Specimen:  Blood Updated:  03/10/18 0823    Narrative:       The following orders were created for panel order CBC & Differential.  Procedure                               Abnormality         Status                     ---------                               -----------         ------                     CBC Auto Differential[[id number]]        Abnormal            Final result                 Please view results for these tests on the individual orders.    CBC Auto Differential [[id number]]  (Abnormal) Collected:  03/10/18 0700    Specimen:  Blood Updated:  03/10/18 0823     WBC 15.78 (H) 10*3/mm3      RBC 4.93 10*6/mm3      Hemoglobin 13.2 g/dL      Hematocrit 45.2 %      MCV 91.7 fL      MCH 26.8 (L) pg      MCHC 29.2 (L)  g/dL      RDW 14.7 %      RDW-SD 49.5 fl      MPV 9.9 fL      Platelets 265 10*3/mm3      Neutrophil % 77.7 %      Lymphocyte % 13.8 (L) %      Monocyte % 5.9 %      Eosinophil % 0.1 %      Basophil % 0.4 %      Immature Grans % 2.1 %      Neutrophils, Absolute 12.26 (H) 10*3/mm3      Lymphocytes, Absolute 2.18 10*3/mm3      Monocytes, Absolute 0.93 10*3/mm3      Eosinophils, Absolute 0.02 10*3/mm3      Basophils, Absolute 0.06 10*3/mm3      Immature Grans, Absolute 0.33 (H) 10*3/mm3      nRBC 0.0 /100 WBC     aPTT [[id number]]  (Normal) Collected:  03/10/18 0744    Specimen:  Blood Updated:  03/10/18 0817     PTT 29.2 seconds     Lactic Acid, Plasma [[id number]]  (Abnormal) Collected:  03/10/18 0743    Specimen:  Blood Updated:  03/10/18 0807     Lactate 7.8 (C) mmol/L     Magnesium [[id number]]  (Abnormal) Collected:  03/10/18 0744    Specimen:  Blood Updated:  03/10/18 0805     Magnesium 2.9 (H) mg/dL     Phosphorus [[id number]]  (Abnormal) Collected:  03/10/18 0744    Specimen:  Blood Updated:  03/10/18 0804     Phosphorus 7.7 (H) mg/dL     Comprehensive Metabolic Panel [[id number]]  (Abnormal) Collected:  03/10/18 0744    Specimen:  Blood Updated:  03/10/18 0804     Glucose 174 (H) mg/dL      BUN 20 mg/dL      Creatinine 1.38 mg/dL      Sodium 147 (H) mmol/L      Potassium 3.3 (L) mmol/L      Chloride 106 mmol/L      CO2 18.0 (L) mmol/L      Calcium 7.1 (L) mg/dL      Total Protein 6.7 g/dL      Albumin 3.40 (L) g/dL      ALT (SGPT) 74 (H) U/L      AST (SGOT) 96 (H) U/L      Alkaline Phosphatase 73 U/L      Total Bilirubin 1.2 (H) mg/dL      eGFR  African Amer 50 (L) mL/min/1.73      Globulin 3.3 gm/dL      A/G Ratio 1.0 (L) g/dL      BUN/Creatinine Ratio 14.5     Anion Gap 23.0 (H) mmol/L     Calcium, Ionized [[id number]]  (Abnormal) Collected:  03/10/18 0610    Specimen:  Blood Updated:  03/10/18 0622     Ionized Calcium 3.93 (L) mg/dL      Collected by [id number]    Blood Gas, Arterial With Co-Ox [[id number]]   (Abnormal) Collected:  03/10/18 0610    Specimen:  Arterial Blood Updated:  03/10/18 0621     Site Arterial Line     Boo's Test N/A     pH, Arterial 7.041 (C) pH units      pCO2, Arterial 63.2 (H) mm Hg      pO2, Arterial 67.0 (L) mm Hg      HCO3, Arterial 17.1 (L) mmol/L      Base Excess, Arterial -14.1 (L) mmol/L      O2 Saturation, Arterial 82.0 (L) %      Hemoglobin, Blood Gas 13.2 (L) g/dL      Hematocrit, Blood Gas 40.5 %      Oxyhemoglobin 80.3 (L) %      Methemoglobin 1.40 %      Carboxyhemoglobin 0.8 %      Temperature 36.0 C      Sodium, Arterial 147 (H) mmol/L      Potassium, Arterial 4.5 mmol/L      Ionized Calcium 3.93 (L) mg/dL      Barometric Pressure for Blood Gas 748 mmHg      Modality Ventilator     FIO2 40 %      Ventilator Mode AC     Set Tidal Volume 500     Set Mech Resp Rate 14.0     PEEP 5.0     Notified Who Dr Bates     Notified By [id number]     Notified Time 03/10/2018 06:20     Collected by [id number]    CBC & Differential [[id number]] Collected:  03/10/18 0609    Specimen:  Blood Updated:  03/10/18 0618    Narrative:       The following orders were created for panel order CBC & Differential.  Procedure                               Abnormality         Status                     ---------                               -----------         ------                     CBC Auto Differential[[id number]]        Abnormal            Final result                 Please view results for these tests on the individual orders.    CBC Auto Differential [[id number]]  (Abnormal) Collected:  03/10/18 0609    Specimen:  Blood Updated:  03/10/18 0618     WBC 17.08 (H) 10*3/mm3      RBC 4.70 10*6/mm3      Hemoglobin 13.0 g/dL      Hematocrit 42.5 %      MCV 90.4 fL      MCH 27.7 (L) pg      MCHC 30.6 (L) g/dL      RDW 14.7 %      RDW-SD 48.4 fl      MPV 9.7 fL      Platelets 250 10*3/mm3      Neutrophil % 74.2 %      Lymphocyte % 16.3 %      Monocyte % 7.1 %      Eosinophil % 0.1 %      Basophil % 0.4 %      Immature  Grans % 1.9 %      Neutrophils, Absolute 12.66 (H) 10*3/mm3      Lymphocytes, Absolute 2.79 10*3/mm3      Monocytes, Absolute 1.21 10*3/mm3      Eosinophils, Absolute 0.02 10*3/mm3      Basophils, Absolute 0.07 10*3/mm3      Immature Grans, Absolute 0.33 (H) 10*3/mm3      nRBC 0.1 (H) /100 WBC     POC Glucose Once [[id number]]  (Abnormal) Collected:  03/10/18 0530    Specimen:  Blood Updated:  03/10/18 0556     Glucose 159 (H) mg/dL      Comment: : [id number] Juan Carlos AndersonferMeter ID: [id number]       Blood Gas, Arterial [[id number]]  (Abnormal) Collected:  03/10/18 0242    Specimen:  Arterial Blood Updated:  03/10/18 0511     Site Arterial Line     Boo's Test N/A     pH, Arterial 7.293 (L) pH units      pCO2, Arterial 47.8 (H) mm Hg      pO2, Arterial 201.0 (H) mm Hg      HCO3, Arterial 23.1 mmol/L      Base Excess, Arterial -3.7 (L) mmol/L      O2 Saturation, Arterial 99.9 (H) %      Temperature 37.5 C      Barometric Pressure for Blood Gas 748 mmHg      Modality Ventilator     FIO2 60 %      Ventilator Mode Volume Support     Set Tidal Volume 500     Set Mech Resp Rate 14.0     PEEP 5.0     Collected by [id number]    CBC & Differential [[id number]] Collected:  03/10/18 0150    Specimen:  Blood Updated:  03/10/18 0508    Narrative:       The following orders were created for panel order CBC & Differential.  Procedure                               Abnormality         Status                     ---------                               -----------         ------                     CBC Auto Differential[[id number]]        Abnormal            Final result                 Please view results for these tests on the individual orders.    CBC Auto Differential [[id number]]  (Abnormal) Collected:  03/10/18 0150    Specimen:  Blood Updated:  03/10/18 0508     WBC 14.99 (H) 10*3/mm3      RBC 4.54 10*6/mm3      Hemoglobin 12.4 g/dL      Hematocrit 39.2 %      MCV 86.3 fL      MCH 27.3 (L) pg      MCHC 31.6 (L) g/dL      RDW 14.5 %       RDW-SD 45.6 fl      MPV 9.8 fL      Platelets 286 10*3/mm3      Neutrophil % 86.4 (H) %      Lymphocyte % 7.7 (L) %      Monocyte % 4.9 %      Eosinophil % 0.1 %      Basophil % 0.3 %      Immature Grans % 0.6 %      Neutrophils, Absolute 12.96 (H) 10*3/mm3      Lymphocytes, Absolute 1.15 10*3/mm3      Monocytes, Absolute 0.74 10*3/mm3      Eosinophils, Absolute 0.01 10*3/mm3      Basophils, Absolute 0.04 10*3/mm3      Immature Grans, Absolute 0.09 (H) 10*3/mm3      nRBC 0.0 /100 WBC     Lactic Acid, Reflex [[id number]]  (Abnormal) Collected:  03/10/18 0408    Specimen:  Blood Updated:  03/10/18 0457     Lactate 5.2 (C) mmol/L     Comprehensive Metabolic Panel [[id number]]  (Abnormal) Collected:  03/10/18 0343    Specimen:  Blood Updated:  03/10/18 0454     Glucose 188 (H) mg/dL      BUN 19 mg/dL      Creatinine 0.98 mg/dL      Sodium 145 mmol/L      Potassium 3.3 (L) mmol/L      Chloride 105 mmol/L      CO2 24.0 mmol/L      Calcium 8.0 (L) mg/dL      Total Protein 6.7 g/dL      Albumin 3.50 g/dL      ALT (SGPT) 52 U/L      AST (SGOT) 51 (H) U/L      Alkaline Phosphatase 72 U/L      Total Bilirubin 0.8 mg/dL      eGFR  African Amer 74 mL/min/1.73      Globulin 3.2 gm/dL      A/G Ratio 1.1 g/dL      BUN/Creatinine Ratio 19.4     Anion Gap 16.0 (H) mmol/L     Magnesium [[id number]]  (Abnormal) Collected:  03/10/18 0343    Specimen:  Blood Updated:  03/10/18 0454     Magnesium 2.3 (H) mg/dL     Phosphorus [[id number]]  (Abnormal) Collected:  03/10/18 0343    Specimen:  Blood Updated:  03/10/18 0454     Phosphorus 5.4 (H) mg/dL     Lactic Acid, Plasma [[id number]]  (Normal) Collected:  03/10/18 0343    Specimen:  Blood Updated:  03/10/18 0454     Lactate 1.8 mmol/L     POC Glucose Once [[id number]]  (Abnormal) Collected:  03/10/18 0430    Specimen:  Blood Updated:  03/10/18 0441     Glucose 249 (H) mg/dL      Comment: : [id number] Juan Carlos AndersonSCI-Waymart Forensic Treatment CenterMeter ID: [id number]       Troponin [[id number]]  (Abnormal) Collected:   03/09/18 2257    Specimen:  Blood Updated:  03/10/18 0415     Troponin I 0.146 (H) ng/mL     CK Total & CKMB [[id number]]  (Abnormal) Collected:  03/09/18 2257    Specimen:  Blood Updated:  03/10/18 0415     CKMB 8.15 (H) ng/mL      Creatine Kinase 193 U/L     CK-MB Index [[id number]]  (Normal) Collected:  03/09/18 2257    Specimen:  Blood Updated:  03/10/18 0415     CK-MB Index 4.2 %     hCG, Serum, Qualitative [[id number]]  (Normal) Collected:  03/10/18 0344    Specimen:  Blood Updated:  03/10/18 0401     HCG Qualitative Negative    Protime-INR [[id number]]  (Abnormal) Collected:  03/10/18 0343    Specimen:  Blood Updated:  03/10/18 0354     Protime 14.6 Seconds      INR 1.10 (H)    aPTT [[id number]]  (Normal) Collected:  03/10/18 0343    Specimen:  Blood Updated:  03/10/18 0353     PTT 26.3 seconds     POC Glucose Once [[id number]]  (Abnormal) Collected:  03/10/18 0147    Specimen:  Blood Updated:  03/10/18 0339     Glucose 191 (H) mg/dL      Comment: : [id number] Juan Carlos [employer] DevelopmentMeter ID: [id number]       Lactic Acid, Reflex Timer (This will reflex a repeat order 3-3:15 hours after ordered.) [[id number]] Collected:  03/09/18 2257    Specimen:  Blood Updated:  03/10/18 0231     Extra Tube Hold for add-ons.     Comment: Auto resulted.       POC Glucose Once [[id number]]  (Normal) Collected:  03/10/18 0028    Specimen:  Blood Updated:  03/10/18 0039     Glucose 118 mg/dL      Comment: : [id number] Juan Carlos [employer] AcademyniferMeter ID: [id number]       Blood Gas, Arterial [[id number]]  (Abnormal) Collected:  03/09/18 2350    Specimen:  Arterial Blood Updated:  03/09/18 2359     Site Right Radial     Boo's Test Positive     pH, Arterial 7.337 (L) pH units      pCO2, Arterial 43.7 mm Hg      pO2, Arterial 177.0 (H) mm Hg      HCO3, Arterial 23.4 mmol/L      Base Excess, Arterial -2.5 (L) mmol/L      O2 Saturation, Arterial 99.8 (H) %      Temperature 38.6 C      Barometric Pressure for Blood Gas 747 mmHg      Modality Ventilator      FIO2 80 %      Ventilator Mode AC     Set Tidal Volume 500     Set [facility] Resp Rate 14.0     PEEP 5.0     Collected by [id number]    Urinalysis With / Culture If Indicated - Urine, Catheter [[id number]]  (Abnormal) Collected:  03/09/18 2237    Specimen:  Urine from Urine, Catheter Updated:  03/09/18 2349     Color, UA Yellow     Appearance, UA Cloudy (A)     pH, UA 7.0     Specific Gravity, UA 1.019     Glucose,  mg/dL (Trace) (A)     Ketones, UA Negative     Bilirubin, UA Negative     Blood, UA Small (1+) (A)     Protein, UA >=300 mg/dL (3+) (A)     Leuk Esterase, UA Negative     Nitrite, UA Negative     Urobilinogen, UA 0.2 E.U./dL    Urinalysis, Microscopic Only - Urine, Clean Catch [[id number]]  (Abnormal) Collected:  03/09/18 2237    Specimen:  Urine from Urine, Catheter Updated:  03/09/18 2349     RBC, UA 6-12 (A) /HPF      WBC, UA 6-12 (A) /HPF      Bacteria, UA 1+ (A) /HPF      Squamous Epithelial Cells, UA None Seen /HPF      Renal Epithelial Cells, UA 3-6 (A) /HPF      Hyaline Casts, UA 0-2 /LPF      Oval Fat Bodies, UA Small/1+ /HPF      Methodology Manual Light Microscopy     Observations, UA  /HPF      1-3 renal epithelial casts/LPF, 2+ free fat globules    Urine Drug Screen - Urine, Clean Catch [[id number]]  (Normal) Collected:  03/09/18 2237    Specimen:  Urine from Urine, Catheter Updated:  03/09/18 2344     Amphetamine Screen, Urine Negative     Barbiturates Screen, Urine Negative     Benzodiazepine Screen, Urine Negative     Cocaine Screen, Urine Negative     Methadone Screen, Urine Negative     Opiate Screen Negative     Phencyclidine (PCP), Urine Negative     THC, Screen, Urine Negative    Narrative:       Negative Thresholds For Drugs Screened in Urine:    Amphetamines          500 ng/ml  Barbiturates          200 ng/ml  Benzodiazepines       200 ng/ml  Cocaine               150 ng/ml  Methadone             150 ng/ml  Opiates               300 ng/ml  Phencyclidine         25 ng/ml  THC                       50 ng/ml    The normal value for all drugs tested is negative. This report includes final unconfirmed screening results.  A positive result by this assay can be, at your request, sent to the Reference Lab for confirmation by gas chromatography. Unconfirmed results must not be used for non-medical purposes, such as employment or legal testing. Clinical consideration should be applied to any drug of abuse test result, particularly when unconfirmed results are used.    Pregnancy, Urine - Urine, Clean Catch [[id number]]  (Normal) Collected:  03/09/18 2237    Specimen:  Urine from Urine, Catheter Updated:  03/09/18 2330     HCG, Urine QL Negative    hCG, Serum, Qualitative [[id number]]  (Normal) Collected:  03/09/18 2257    Specimen:  Blood Updated:  03/09/18 2321     HCG Qualitative Negative    Lactic Acid, Plasma [[id number]]  (Abnormal) Collected:  03/09/18 2257    Specimen:  Blood Updated:  03/09/18 2320     Lactate 3.4 (C) mmol/L     Phosphorus [[id number]]  (Abnormal) Collected:  03/09/18 2257    Specimen:  Blood Updated:  03/09/18 2319     Phosphorus 4.7 (H) mg/dL     [place] Draw [[id number]] Collected:  03/09/18 2146    Specimen:  Blood Updated:  03/09/18 2306    Narrative:       The following orders were created for panel order [place] Draw.  Procedure                               Abnormality         Status                     ---------                               -----------         ------                     Light Blue Top[[id number]]                                    Final result               Green Top (Gel)[[id number]]                                   Final result               Lavender Top[[id number]]                                      Final result               Red Top[[id number]]                                           Final result                 Please view results for these tests on the individual orders.    Light Blue Top [[id number]] Collected:  03/09/18 2146    Specimen:  Blood Updated:  03/09/18  2306     Extra Tube hold for add-on     Comment: Auto resulted       Green Top (Gel) [[id number]] Collected:  03/09/18 2146    Specimen:  Blood Updated:  03/09/18 2306     Extra Tube Hold for add-ons.     Comment: Auto resulted.       Lavender Top [[id number]] Collected:  03/09/18 2146    Specimen:  Blood Updated:  03/09/18 2306     Extra Tube hold for add-on     Comment: Auto resulted       Red Top [[id number]] Collected:  03/09/18 2146    Specimen:  Blood Updated:  03/09/18 2306     Extra Tube Hold for add-ons.     Comment: Auto resulted.       TSH [[id number]]  (Normal) Collected:  03/09/18 2146    Specimen:  Blood Updated:  03/09/18 2305     TSH 3.620 mIU/mL     Calcium, Ionized [[id number]]  (Abnormal) Collected:  03/09/18 2254    Specimen:  Blood Updated:  03/09/18 2300     Ionized Calcium 4.46 (L) mg/dL      Collected by Niels Vaca RN    T4, Free [[id number]]  (Normal) Collected:  03/09/18 2146    Specimen:  Blood Updated:  03/09/18 2251     Free T4 1.32 ng/dL     Procalcitonin [[id number]]  (Normal) Collected:  03/09/18 2146    Specimen:  Blood Updated:  03/09/18 2242     Procalcitonin <0.25 ng/mL     Narrative:       SIRS, sepsis, severe sepsis, and septic shock are categorized according to the criteria of the consensus conference of the American College of Chest Physicians/Society of Critical Care Medicine.    PCT < 0.5 ng/mL     Systemic infection (sepsis) is not likely.    PCT >0.5 and < 2.0 ng/mL Systemic infection (sepsis) is possible, but other conditions are known to elevate PCT as well.    PCT > 2.0 ng/mL     Systemic infection (sepsis) is likely, unless other causes are known.      PCT > 10.0 ng/mL    Important systemic inflammatory response, almost exclusively due to severe bacterial sepsis or septic shock.    PCT values of < 0.5 ng/mL do not exclude an infection, because localized infections (without systemic signs) may be associated with such low concentrations, or a systemic infection in its  initial stages (<6 hours).  Increased PCT can occur without infection.  PCT concentrations between 0.5 and 2.0 ng/mL should be interpreted taking into account the patients history.  It is recommended to retest PCT within 6-24 hours if any concentrations < 2.0 ng/mL are obtained.    CK [[id number]]  (Normal) Collected:  03/09/18 2146    Specimen:  Blood Updated:  03/09/18 2234     Creatine Kinase 176 U/L     Troponin [[id number]]  (Abnormal) Collected:  03/09/18 2146    Specimen:  Blood Updated:  03/09/18 2220     Troponin I 0.059 (H) ng/mL     Ethanol [[id number]]  (Normal) Collected:  03/09/18 2146    Specimen:  Blood Updated:  03/09/18 2220     Ethanol % <0.010 %     Narrative:       Not for legal purposes. Chain of Custody not followed.     CK Total & CKMB [[id number]]  (Normal) Collected:  03/09/18 2146    Specimen:  Blood Updated:  03/09/18 2220     CKMB 4.23 ng/mL      Creatine Kinase 176 U/L     Narrative:       CKMB Index not indicated    BNP [[id number]]  (Abnormal) Collected:  03/09/18 2146    Specimen:  Blood Updated:  03/09/18 2220     proBNP 4,230.0 (H) pg/mL     Magnesium [[id number]]  (Normal) Collected:  03/09/18 2146    Specimen:  Blood Updated:  03/09/18 2214     Magnesium 2.1 mg/dL      Comment: Specimen hemolyzed.  Results may be affected.       Comprehensive Metabolic Panel [[id number]]  (Abnormal) Collected:  03/09/18 2146    Specimen:  Blood Updated:  03/09/18 2213     Glucose 180 (H) mg/dL      BUN 18 mg/dL      Comment: Specimen hemolyzed. Results may be affected.        Creatinine 1.14 mg/dL      Sodium 142 mmol/L      Potassium 3.1 (L) mmol/L      Comment: Specimen hemolyzed.  Results may be affected.        Chloride 101 mmol/L      CO2 25.0 mmol/L      Calcium 8.5 mg/dL      Total Protein 7.6 g/dL      Albumin 4.00 g/dL      ALT (SGPT) 50 U/L      Comment: Specimen hemolyzed.  Results may be affected.        AST (SGOT) 53 (H) U/L      Comment: Specimen hemolyzed.  Results may be affected.         Alkaline Phosphatase 84 U/L      Comment: Specimen hemolyzed. Results may be affected.        Total Bilirubin 0.8 mg/dL      eGFR   Amer 62 mL/min/1.73      Globulin 3.6 gm/dL      A/G Ratio 1.1 g/dL      BUN/Creatinine Ratio 15.8     Anion Gap 16.0 (H) mmol/L     Protime-INR [[id number]]  (Normal) Collected:  03/09/18 2146    Specimen:  Blood Updated:  03/09/18 2207     Protime 14.5 Seconds      INR 1.09    aPTT [[id number]]  (Normal) Collected:  03/09/18 2146    Specimen:  Blood Updated:  03/09/18 2207     PTT 25.4 seconds     CBC & Differential [[id number]] Collected:  03/09/18 2146    Specimen:  Blood Updated:  03/09/18 2158    Narrative:       The following orders were created for panel order CBC & Differential.  Procedure                               Abnormality         Status                     ---------                               -----------         ------                     CBC Auto Differential[[id number]]        Abnormal            Final result                 Please view results for these tests on the individual orders.    CBC Auto Differential [[id number]]  (Abnormal) Collected:  03/09/18 2146    Specimen:  Blood Updated:  03/09/18 2158     WBC 20.13 (H) 10*3/mm3      RBC 4.80 10*6/mm3      Hemoglobin 13.1 g/dL      Hematocrit 42.5 %      MCV 88.5 fL      MCH 27.3 (L) pg      MCHC 30.8 (L) g/dL      RDW 14.4 %      RDW-SD 46.5 fl      MPV 9.7 fL      Platelets 313 10*3/mm3      Neutrophil % 84.2 (H) %      Lymphocyte % 10.2 (L) %      Monocyte % 3.4 (L) %      Eosinophil % 0.6 %      Basophil % 0.3 %      Immature Grans % 1.3 %      Neutrophils, Absolute 16.94 (H) 10*3/mm3      Lymphocytes, Absolute 2.06 10*3/mm3      Monocytes, Absolute 0.68 10*3/mm3      Eosinophils, Absolute 0.12 10*3/mm3      Basophils, Absolute 0.07 10*3/mm3      Immature Grans, Absolute 0.26 (H) 10*3/mm3      nRBC 0.0 /100 WBC     Blood Gas, Arterial [[id number]]  (Abnormal) Collected:  03/09/18 1479    Specimen:  Arterial  Blood Updated:  03/09/18 2155     Site Right Radial     Boo's Test Positive     pH, Arterial 7.271 (L) pH units      pCO2, Arterial 48.4 (H) mm Hg      pO2, Arterial 118.0 (H) mm Hg      HCO3, Arterial 22.3 mmol/L      Base Excess, Arterial -4.9 (L) mmol/L      O2 Saturation, Arterial 98.0 %      Temperature 37.0 C      Barometric Pressure for Blood Gas 748 mmHg      Modality Ventilator     FIO2 100 %      Ventilator Mode AC     Set Tidal Volume 470     Set Mech Resp Rate 14.0     PEEP 5.0     Collected by [id number]          Assessment/Plan    SEVERE DILATED CARDIOMYOPATHY - lab does not look like volume overload but difficult to assess by exam. Will start using Lasix ivp q 12  MOORE - on mult pressors now with poor uop  UNLIKELY TO SURVIVE    Principal Problem:    Cardiac arrest  Active Problems:    Hypertension    Metabolic acidosis    Elevated troponin    Lactic acidosis    Cardiomegaly    Cardiogenic shock    Shock liver    SEAMUS (acute kidney injury)    Ventricular tachycardia    Anoxic brain injury      THANKS - CONT SUPPORTIVE CARE FOR NOW. I WILL FOLLOW    I discussed the patients findings and my recommendations with patient and nursing staff.     Cristian Stallings MD  03/11/18  10:20 AM

## 2018-03-12 VITALS
DIASTOLIC BLOOD PRESSURE: 84 MMHG | WEIGHT: 293 LBS | BODY MASS INDEX: 50.02 KG/M2 | HEIGHT: 64 IN | OXYGEN SATURATION: 82 % | RESPIRATION RATE: 22 BRPM | TEMPERATURE: 93.3 F | SYSTOLIC BLOOD PRESSURE: 116 MMHG

## 2018-03-12 LAB
BACTERIA SPEC RESP CULT: NORMAL
BACTERIA SPEC RESP CULT: NORMAL
GRAM STN SPEC: NORMAL
GRAM STN SPEC: NORMAL

## 2018-03-12 NOTE — DISCHARGE SUMMARY
[facility] Medicine Services  DEATH SUMMARY       Date of Admission: 3/9/2018  Date of Death:  3/11/2018 at approximately 2346  Primary Care Physician: SB Michel    Presenting Problem/History of Present Illness:  Cardiac arrest [I46.9]     Final Death Diagnoses:  Hospital Problem List     * (Principal)Cardiac arrest    Cardiogenic shock    Anoxic brain injury    Metabolic acidosis    Elevated troponin    Lactic acidosis    Cardiomegaly    Shock liver    SEAMUS (acute kidney injury)    Ventricular tachycardia    Hypertension        Cause of death:  Anoxic brain injury secondary to  Cardiopulmonary arrest secondary to  Cardiomyopathy      Consults:   Cardiology  Pulmonology    Procedures Performed:   Intubation  Central line placement  Cardiopulmonary resuscitation ×2    Pertinent Test Results:    Lactic Acid, Plasma [[id number]]  (Abnormal) Collected:  03/11/18 1837    Specimen:  Blood Updated:  03/11/18 1907     Lactate 6.0 (C) mmol/L     Comprehensive Metabolic Panel [[id number]]  (Abnormal) Collected:  03/11/18 1837    Specimen:  Blood Updated:  03/11/18 1858     Glucose 153 (H) mg/dL      BUN 25 (H) mg/dL      Creatinine 1.82 (H) mg/dL      Sodium 142 mmol/L      Potassium 3.9 mmol/L      Chloride 101 mmol/L      CO2 25.0 mmol/L      Calcium 6.9 (L) mg/dL      Total Protein 5.7 (L) g/dL      Albumin 2.90 (L) g/dL      ALT (SGPT) 454 (H) U/L      AST (SGOT) 722 (H) U/L      Alkaline Phosphatase 56 U/L      Total Bilirubin 1.3 (H) mg/dL      eGFR   Amer 36 (L) mL/min/1.73      Globulin 2.8 gm/dL      A/G Ratio 1.0 (L) g/dL      BUN/Creatinine Ratio 13.7     Anion Gap 16.0 (H) mmol/L     Magnesium [[id number]]  (Normal) Collected:  03/11/18 1837    Specimen:  Blood Updated:  03/11/18 1858     Magnesium 2.1 mg/dL     Phosphorus [[id number]]  (Abnormal) Collected:  03/11/18 1837    Specimen:  Blood Updated:  03/11/18 1858     Phosphorus 5.3 (H) mg/dL     aPTT  [[id number]]  (Normal) Collected:  03/11/18 1837    Specimen:  Blood Updated:  03/11/18 1856     PTT 30.8 seconds     CBC Auto Differential [[id number]]  (Abnormal) Collected:  03/11/18 1837    Specimen:  Blood Updated:  03/11/18 1848     WBC 15.25 (H) 10*3/mm3      RBC 4.95 10*6/mm3      Hemoglobin 13.4 g/dL      Hematocrit 42.4 %      MCV 85.7 fL      MCH 27.1 (L) pg      MCHC 31.6 (L) g/dL      RDW 14.9 %      RDW-SD 46.1 fl      MPV 9.9 fL      Platelets 196 10*3/mm3      Neutrophil % 78.2 (H) %      Lymphocyte % 14.0 (L) %      Monocyte % 6.9 %      Eosinophil % 0.1 %      Basophil % 0.3 %      Immature Grans % 0.5 %      Neutrophils, Absolute 11.92 (H) 10*3/mm3      Lymphocytes, Absolute 2.14 10*3/mm3      Monocytes, Absolute 1.05 10*3/mm3      Eosinophils, Absolute 0.02 10*3/mm3      Basophils, Absolute 0.05 10*3/mm3      Immature Grans, Absolute 0.07 (H) 10*3/mm3      nRBC 0.3 (H) /100 WBC     POC Glucose Once [[id number]]  (Abnormal) Collected:  03/11/18 1828    Specimen:  Blood Updated:  03/11/18 1846     Glucose 146 (H) mg/dL      Comment: : [id number] Luis Alberto (Bone) ShannonMeter ID: [id number]       Calcium, Ionized [[id number]]  (Abnormal) Collected:  03/11/18 1832    Specimen:  Blood Updated:  03/11/18 1842     Ionized Calcium 3.65 (L) mg/dL      Collected by [id number]    POC Glucose Once [[id number]]  (Abnormal) Collected:  03/11/18 1440    Specimen:  Blood Updated:  03/11/18 1459     Glucose 157 (H) mg/dL      Comment: : [id number] [place] (Bone) ShannonMeter ID: [id number]       aPTT [[id number]]  (Normal) Collected:  03/11/18 1251    Specimen:  Blood Updated:  03/11/18 1342     PTT 34.1 seconds     Lactic Acid, Plasma [[id number]]  (Abnormal) Collected:  03/11/18 1245    Specimen:  Blood Updated:  03/11/18 1331     Lactate 11.1 (C) mmol/L     Comprehensive Metabolic Panel [[id number]]  (Abnormal) Collected:  03/11/18 1251    Specimen:  Blood Updated:  03/11/18 1315     Glucose 171 (H) mg/dL      BUN 24  (H) mg/dL      Creatinine 1.86 (H) mg/dL      Sodium 146 (H) mmol/L      Potassium 3.6 mmol/L      Chloride 100 mmol/L      CO2 20.0 (L) mmol/L      Calcium 7.1 (L) mg/dL      Total Protein 6.2 (L) g/dL      Albumin 3.20 (L) g/dL      ALT (SGPT) 500 (H) U/L      AST (SGOT) 733 (H) U/L      Alkaline Phosphatase 63 U/L      Total Bilirubin 1.5 (H) mg/dL      eGFR  African Amer 35 (L) mL/min/1.73      Globulin 3.0 gm/dL      A/G Ratio 1.1 g/dL      BUN/Creatinine Ratio 12.9     Anion Gap 26.0 (H) mmol/L     Magnesium [[id number]]  (Normal) Collected:  03/11/18 1251    Specimen:  Blood Updated:  03/11/18 1315     Magnesium 2.2 mg/dL     Phosphorus [[id number]]  (Abnormal) Collected:  03/11/18 1251    Specimen:  Blood Updated:  03/11/18 1315     Phosphorus 7.2 (H) mg/dL     CBC Auto Differential [[id number]]  (Abnormal) Collected:  03/11/18 1251    Specimen:  Blood Updated:  03/11/18 1305     WBC 16.98 (H) 10*3/mm3      RBC 5.23 10*6/mm3      Hemoglobin 14.2 g/dL      Hematocrit 46.3 %      MCV 88.5 fL      MCH 27.2 (L) pg      MCHC 30.7 (L) g/dL      RDW 15.0 %      RDW-SD 48.7 fl      MPV 10.1 fL      Platelets 210 10*3/mm3      Neutrophil % 81.5 (H) %      Lymphocyte % 11.5 (L) %      Monocyte % 5.8 %      Eosinophil % 0.1 %      Basophil % 0.2 %      Immature Grans % 0.9 %      Neutrophils, Absolute 13.84 (H) 10*3/mm3      Lymphocytes, Absolute 1.96 10*3/mm3      Monocytes, Absolute 0.98 10*3/mm3      Eosinophils, Absolute 0.01 10*3/mm3      Basophils, Absolute 0.03 10*3/mm3      Immature Grans, Absolute 0.16 (H) 10*3/mm3      nRBC 0.0 /100 WBC     POC Glucose Once [[id number]]  (Abnormal) Collected:  03/11/18 1239    Specimen:  Blood Updated:  03/11/18 1259     Glucose 157 (H) mg/dL      Comment: : [id number] Luis Alberto (Bone) ShannonMeter ID: [id number]       Calcium, Ionized [[id number]]  (Abnormal) Collected:  03/11/18 1234    Specimen:  Blood Updated:  03/11/18 1240     Ionized Calcium 3.45 (L) mg/dL      Collected by  [id number]    Blood Gas, Arterial [[id number]]  (Abnormal) Collected:  03/11/18 1235    Specimen:  Arterial Blood Updated:  03/11/18 1239     Site Arterial Line     Boo's Test N/A     pH, Arterial 7.244 (L) pH units      pCO2, Arterial 42.4 mm Hg      pO2, Arterial 97.8 mm Hg      HCO3, Arterial 18.3 (L) mmol/L      Base Excess, Arterial -8.7 (L) mmol/L      O2 Saturation, Arterial 97.1 %      Temperature 32.4 C      Barometric Pressure for Blood Gas 749 mmHg      Modality Ventilator     FIO2 50 %      Ventilator Mode AC     Set Tidal Volume 500     Set Mech Resp Rate 22.0     PEEP 5.0     Collected by [id number]    CBC Auto Differential [[id number]]  (Abnormal) Collected:  03/11/18 0637    Specimen:  Blood Updated:  03/11/18 0737     WBC 20.47 (H) 10*3/mm3      RBC 5.36 10*6/mm3      Hemoglobin 14.7 g/dL      Hematocrit 48.5 (H) %      MCV 90.5 fL      MCH 27.4 (L) pg      MCHC 30.3 (L) g/dL      RDW 15.2 (H) %      RDW-SD 49.9 fl      MPV 9.9 fL      Platelets 232 10*3/mm3      Neutrophil % 78.2 (H) %      Lymphocyte % 13.7 (L) %      Monocyte % 7.4 %      Eosinophil % 0.0 %      Basophil % 0.1 %      Immature Grans % 0.6 %      Neutrophils, Absolute 16.00 (H) 10*3/mm3      Lymphocytes, Absolute 2.80 10*3/mm3      Monocytes, Absolute 1.51 (H) 10*3/mm3      Eosinophils, Absolute 0.00 10*3/mm3      Basophils, Absolute 0.03 10*3/mm3      Immature Grans, Absolute 0.13 (H) 10*3/mm3      nRBC 0.1 (H) /100 WBC     Lactic Acid, Plasma [[id number]]  (Abnormal) Collected:  03/11/18 0636    Specimen:  Blood Updated:  03/11/18 0728     Lactate 10.8 (C) mmol/L     Urine Culture - Urine, Urine, Clean Catch [[id number]]  (Normal) Collected:  03/09/18 2237    Specimen:  Urine from Urine, Catheter Updated:  03/11/18 0710     Urine Culture No growth at 2 days    Comprehensive Metabolic Panel [[id number]]  (Abnormal) Collected:  03/11/18 0637    Specimen:  Blood Updated:  03/11/18 0708     Glucose 134 (H) mg/dL      BUN 22 (H) mg/dL       Comment: Specimen hemolyzed. Results may be affected.        Creatinine 1.83 (H) mg/dL      Sodium 145 mmol/L      Potassium 4.1 mmol/L      Chloride 105 mmol/L      CO2 14.0 (L) mmol/L      Calcium 7.2 (L) mg/dL      Total Protein 6.5 g/dL      Albumin 3.40 (L) g/dL      ALT (SGPT) 337 (H) U/L      AST (SGOT) 533 (H) U/L      Alkaline Phosphatase 61 U/L      Total Bilirubin 1.6 (H) mg/dL      eGFR   Amer 36 (L) mL/min/1.73      Globulin 3.1 gm/dL      A/G Ratio 1.1 g/dL      BUN/Creatinine Ratio 12.0     Anion Gap 26.0 (H) mmol/L     Magnesium [[id number]]  (Abnormal) Collected:  03/11/18 0637    Specimen:  Blood Updated:  03/11/18 0704     Magnesium 2.3 (H) mg/dL     Phosphorus [[id number]]  (Abnormal) Collected:  03/11/18 0637    Specimen:  Blood Updated:  03/11/18 0704     Phosphorus 8.6 (H) mg/dL     aPTT [[id number]]  (Normal) Collected:  03/11/18 0637    Specimen:  Blood Updated:  03/11/18 0658     PTT 33.1 seconds     POC Glucose Once [[id number]]  (Normal) Collected:  03/11/18 0619    Specimen:  Blood Updated:  03/11/18 0631     Glucose 120 mg/dL      Comment: : [id number] Juan Carlos AndersonferMeter ID: [id number]       Calcium, Ionized [[id number]]  (Abnormal) Collected:  03/11/18 0620    Specimen:  Blood Updated:  03/11/18 0622     Ionized Calcium 3.38 (L) mg/dL      Collected by [id number]    Blood Gas, Arterial [[id number]]  (Abnormal) Collected:  03/11/18 0408    Specimen:  Arterial Blood Updated:  03/11/18 0432     Site Arterial Line     Boo's Test N/A     pH, Arterial 7.143 (C) pH units      pCO2, Arterial 38.2 mm Hg      pO2, Arterial 71.9 (L) mm Hg      HCO3, Arterial 13.1 (L) mmol/L      Base Excess, Arterial -15.2 (L) mmol/L      O2 Saturation, Arterial 91.0 (L) %      Temperature 37.0 C      Barometric Pressure for Blood Gas 749 mmHg      Modality Ventilator     FIO2 40 %      Ventilator Mode AC     Set Tidal Volume 500     Set Mech Resp Rate 22.0     PEEP 5.0     Notified Who [id number]     Notified By  [id number]     Notified Time 03/11/2018 04:21     Collected by [id number]    Lactic Acid, Plasma [[id number]]  (Abnormal) Collected:  03/10/18 2353    Specimen:  Blood Updated:  03/11/18 0051     Lactate 10.9 (C) mmol/L     Calcium, Ionized [[id number]]  (Abnormal) Collected:  03/11/18 0026    Specimen:  Blood Updated:  03/11/18 0038     Ionized Calcium 3.75 (L) mg/dL      Collected by [id number]    Troponin [[id number]]  (Abnormal) Collected:  03/10/18 2354    Specimen:  Blood Updated:  03/11/18 0028     Troponin I 0.409 (H) ng/mL     CBC Auto Differential [[id number]]  (Abnormal) Collected:  03/10/18 2353    Specimen:  Blood Updated:  03/11/18 0020     WBC 17.92 (H) 10*3/mm3      RBC 5.23 10*6/mm3      Hemoglobin 14.4 g/dL      Hematocrit 47.4 (H) %      MCV 90.6 fL      MCH 27.5 (L) pg      MCHC 30.4 (L) g/dL      RDW 15.0 %      RDW-SD 49.8 fl      MPV 9.6 fL      Platelets 220 10*3/mm3      Neutrophil % 80.8 (H) %      Lymphocyte % 12.7 (L) %      Monocyte % 4.9 %      Eosinophil % 0.1 %      Basophil % 0.3 %      Immature Grans % 1.2 %      Neutrophils, Absolute 14.49 (H) 10*3/mm3      Lymphocytes, Absolute 2.28 10*3/mm3      Monocytes, Absolute 0.87 10*3/mm3      Eosinophils, Absolute 0.01 10*3/mm3      Basophils, Absolute 0.05 10*3/mm3      Immature Grans, Absolute 0.22 (H) 10*3/mm3      nRBC 0.2 (H) /100 WBC     Comprehensive Metabolic Panel [[id number]]  (Abnormal) Collected:  03/10/18 2354    Specimen:  Blood Updated:  03/11/18 0017     Glucose 159 (H) mg/dL      BUN 21 mg/dL      Creatinine 1.57 (H) mg/dL      Sodium 147 (H) mmol/L      Potassium 3.8 mmol/L      Chloride 106 mmol/L      CO2 15.0 (L) mmol/L      Calcium 7.1 (L) mg/dL      Total Protein 6.2 (L) g/dL      Albumin 3.20 (L) g/dL      ALT (SGPT) 164 (H) U/L      AST (SGOT) 271 (H) U/L      Alkaline Phosphatase 66 U/L      Total Bilirubin 1.6 (H) mg/dL      eGFR   Amer 43 (L) mL/min/1.73      Globulin 3.0 gm/dL      A/G Ratio 1.1 g/dL      BUN/Creatinine  Ratio 13.4     Anion Gap 26.0 (H) mmol/L     Phosphorus [[id number]]  (Abnormal) Collected:  03/10/18 2354    Specimen:  Blood Updated:  03/11/18 0017     Phosphorus 8.0 (H) mg/dL     Magnesium [[id number]]  (Abnormal) Collected:  03/10/18 2354    Specimen:  Blood Updated:  03/11/18 0017     Magnesium 2.3 (H) mg/dL     aPTT [[id number]]  (Normal) Collected:  03/10/18 2354    Specimen:  Blood Updated:  03/11/18 0012     PTT 34.3 seconds     Blood Gas, Arterial [[id number]]  (Abnormal) Collected:  03/10/18 1845    Specimen:  Arterial Blood Updated:  03/10/18 1858     Site Arterial Line     Boo's Test N/A     pH, Arterial 7.118 (C) pH units      pCO2, Arterial 34.8 (L) mm Hg      pO2, Arterial 139.0 (H) mm Hg      HCO3, Arterial 11.2 (L) mmol/L      Base Excess, Arterial -17.2 (L) mmol/L      O2 Saturation, Arterial 98.5 %      Temperature 37.0 C      Barometric Pressure for Blood Gas 749 mmHg      Modality Ventilator     FIO2 40 %      Ventilator Mode AC     Set Tidal Volume 500     Set Mech Resp Rate 22.0     PEEP 5.0     Notified Who [id number]     Notified By [id number]     Notified Time 03/10/2018 18:58     Collected by [id number]    Calcium, Ionized [[id number]]  (Abnormal) Collected:  03/10/18 1839    Specimen:  Blood Updated:  03/10/18 1849     Ionized Calcium 3.76 (L) mg/dL      Collected by [id number]    Lactic Acid, Plasma [[id number]]  (Abnormal) Collected:  03/10/18 1736    Specimen:  Blood Updated:  03/10/18 1823     Lactate 11.7 (C) mmol/L     Troponin [[id number]]  (Abnormal) Collected:  03/10/18 1737    Specimen:  Blood Updated:  03/10/18 1807     Troponin I 0.466 (H) ng/mL     Magnesium [[id number]]  (Abnormal) Collected:  03/10/18 1737    Specimen:  Blood Updated:  03/10/18 1757     Magnesium 2.7 (H) mg/dL      Comment: Specimen hemolyzed.  Results may be affected.       Phosphorus [[id number]]  (Abnormal) Collected:  03/10/18 1737    Specimen:  Blood Updated:  03/10/18 1757     Phosphorus 8.6 (H) mg/dL      Comment:  Specimen hemolyzed.  Results may be affected.       Comprehensive Metabolic Panel [[id number]]  (Abnormal) Collected:  03/10/18 1737    Specimen:  Blood Updated:  03/10/18 1756     Glucose 95 mg/dL      BUN 20 mg/dL      Comment: Specimen hemolyzed. Results may be affected.        Creatinine 1.58 (H) mg/dL      Sodium 144 mmol/L      Potassium 4.4 mmol/L      Comment: Specimen hemolyzed.  Results may be affected.        Chloride 106 mmol/L      CO2 11.0 (L) mmol/L      Calcium 7.1 (L) mg/dL      Total Protein 6.7 g/dL      Albumin 3.50 g/dL      ALT (SGPT) 86 (H) U/L      Comment: Specimen hemolyzed.  Results may be affected.        AST (SGOT) 166 (H) U/L      Comment: Specimen hemolyzed.  Results may be affected.        Alkaline Phosphatase 70 U/L      Comment: Specimen hemolyzed. Results may be affected.        Total Bilirubin 1.3 (H) mg/dL      eGFR   Amer 43 (L) mL/min/1.73      Globulin 3.2 gm/dL      A/G Ratio 1.1 g/dL      BUN/Creatinine Ratio 12.7     Anion Gap 27.0 (H) mmol/L     CBC Auto Differential [[id number]]  (Abnormal) Collected:  03/10/18 1736    Specimen:  Blood Updated:  03/10/18 1752     WBC 20.46 (H) 10*3/mm3      RBC 5.38 10*6/mm3      Hemoglobin 14.6 g/dL      Hematocrit 49.2 (H) %      MCV 91.4 fL      MCH 27.1 (L) pg      MCHC 29.7 (L) g/dL      RDW 15.2 (H) %      RDW-SD 50.4 fl      MPV 9.7 fL      Platelets 249 10*3/mm3      Neutrophil % 77.4 %      Lymphocyte % 13.7 (L) %      Monocyte % 7.4 %      Eosinophil % 0.0 %      Basophil % 0.3 %      Immature Grans % 1.2 %      Neutrophils, Absolute 15.82 (H) 10*3/mm3      Lymphocytes, Absolute 2.80 10*3/mm3      Monocytes, Absolute 1.52 (H) 10*3/mm3      Eosinophils, Absolute 0.01 10*3/mm3      Basophils, Absolute 0.06 10*3/mm3      Immature Grans, Absolute 0.25 (H) 10*3/mm3      nRBC 0.1 (H) /100 WBC     aPTT [[id number]]  (Normal) Collected:  03/10/18 1737    Specimen:  Blood Updated:  03/10/18 1752     PTT 33.5 seconds     POC Glucose  Once [[id number]]  (Normal) Collected:  03/10/18 1559    Specimen:  Blood Updated:  03/10/18 1619     Glucose 94 mg/dL      Comment: : [id number] Luis Alberto (Bone) ShannonMeter ID: [id number]       Calcium, Ionized [[id number]]  (Abnormal) Collected:  03/10/18 1459    Specimen:  Blood Updated:  03/10/18 1505     Ionized Calcium 3.58 (L) mg/dL      Collected by [id number]    Blood Gas, Arterial [[id number]]  (Abnormal) Collected:  03/10/18 1458    Specimen:  Arterial Blood Updated:  03/10/18 1501     Site Arterial Line     Boo's Test N/A     pH, Arterial 7.142 (C) pH units      pCO2, Arterial 36.3 mm Hg      pO2, Arterial 154.0 (H) mm Hg      HCO3, Arterial 12.4 (L) mmol/L      Base Excess, Arterial -15.7 (L) mmol/L      O2 Saturation, Arterial 99.0 %      Temperature 34.3 C      Barometric Pressure for Blood Gas 748 mmHg      Modality Ventilator     FIO2 50 %      Ventilator Mode AC     Set Tidal Volume 500     Set Mech Resp Rate 22.0     PEEP 5.0     Notified [facility] PARVEEN RN     Notified By [id number]     Notified Time 03/10/2018 15:02     Collected by [id number]    Influenza Antigen, Rapid - Swab, Nasopharynx [[id number]]  (Normal) Collected:  03/10/18 1405    Specimen:  Swab from Nasopharynx Updated:  03/10/18 1432     Influenza A Ag, EIA Negative     Influenza B Ag, EIA Negative    CBC Auto Differential [[id number]]  (Abnormal) Collected:  03/10/18 1247    Specimen:  Blood Updated:  03/10/18 1409     WBC 21.17 (H) 10*3/mm3      RBC 5.15 10*6/mm3      Hemoglobin 14.1 g/dL      Hematocrit 46.4 %      MCV 90.1 fL      MCH 27.4 (L) pg      MCHC 30.4 (L) g/dL      RDW 14.7 %      RDW-SD 48.4 fl      MPV 10.0 fL      Platelets 257 10*3/mm3      Neutrophil % 81.2 (H) %      Lymphocyte % 10.6 (L) %      Monocyte % 6.3 %      Eosinophil % 0.0 %      Basophil % 0.2 %      Immature Grans % 1.7 %      Neutrophils, Absolute 17.18 (H) 10*3/mm3      Lymphocytes, Absolute 2.25 10*3/mm3      Monocytes, Absolute 1.34 (H) 10*3/mm3       Eosinophils, Absolute 0.01 10*3/mm3      Basophils, Absolute 0.04 10*3/mm3      Immature Grans, Absolute 0.35 (H) 10*3/mm3      nRBC 0.1 (H) /100 WBC     Troponin [[id number]]  (Abnormal) Collected:  03/10/18 1247    Specimen:  Blood Updated:  03/10/18 1331     Troponin I 0.402 (H) ng/mL     Comprehensive Metabolic Panel [[id number]]  (Abnormal) Collected:  03/10/18 1247    Specimen:  Blood Updated:  03/10/18 1325     Glucose 159 (H) mg/dL      BUN 19 mg/dL      Creatinine 1.38 mg/dL      Sodium 143 mmol/L      Potassium 5.4 (H) mmol/L      Chloride 105 mmol/L      CO2 15.0 (L) mmol/L      Calcium 7.4 (L) mg/dL      Total Protein 6.8 g/dL      Albumin 3.50 g/dL      ALT (SGPT) 88 (H) U/L      AST (SGOT) 128 (H) U/L      Alkaline Phosphatase 72 U/L      Total Bilirubin 1.1 (H) mg/dL      eGFR  African Amer 50 (L) mL/min/1.73      Globulin 3.3 gm/dL      A/G Ratio 1.1 g/dL      BUN/Creatinine Ratio 13.8     Anion Gap 23.0 (H) mmol/L     aPTT [[id number]]  (Normal) Collected:  03/10/18 1248    Specimen:  Blood Updated:  03/10/18 1324     PTT 30.3 seconds     Lactic Acid, Plasma [[id number]]  (Abnormal) Collected:  03/10/18 1247    Specimen:  Blood Updated:  03/10/18 1324     Lactate 10.7 (C) mmol/L     Magnesium [[id number]]  (Abnormal) Collected:  03/10/18 1247    Specimen:  Blood Updated:  03/10/18 1321     Magnesium 2.8 (H) mg/dL     Phosphorus [[id number]]  (Abnormal) Collected:  03/10/18 1247    Specimen:  Blood Updated:  03/10/18 1321     Phosphorus 7.2 (H) mg/dL     POC Glucose Once [[id number]]  (Abnormal) Collected:  03/10/18 1245    Specimen:  Blood Updated:  03/10/18 1313     Glucose 145 (H) mg/dL      Comment: : [id number] Luis Alberto (Bone) ShannonMeter ID: [id number]       Lactic Acid, Plasma [[id number]]  (Abnormal) Collected:  03/10/18 1050    Specimen:  Blood Updated:  03/10/18 1137     Lactate 7.4 (C) mmol/L     Blood Gas, Arterial [[id number]]  (Abnormal) Collected:  03/10/18 1111    Specimen:  Arterial Blood  Updated:  03/10/18 1115     Site Arterial Line     Boo's Test N/A     pH, Arterial 7.259 (L) pH units      pCO2, Arterial 32.1 (L) mm Hg      pO2, Arterial 302.0 (H) mm Hg      HCO3, Arterial 14.4 (L) mmol/L      Base Excess, Arterial -11.6 (L) mmol/L      O2 Saturation, Arterial >100.1 (H) %      Temperature 37.0 C      Barometric Pressure for Blood Gas 749 mmHg      Modality Ventilator     FIO2 100 %      Ventilator Mode AC     Set Tidal Volume 500     Set Mech Resp Rate 22.0     PEEP 8.0     Collected by [id number]    SCANNED - LABS [[id number]] Resulted:  03/09/18      Updated:  03/10/18 0953    Blood Gas, Arterial [[id number]]  (Abnormal) Collected:  03/10/18 0830    Specimen:  Arterial Blood Updated:  03/10/18 0834     Site Arterial Line     Boo's Test N/A     pH, Arterial 7.082 (C) pH units      pCO2, Arterial 63.5 (H) mm Hg      pO2, Arterial 147.0 (H) mm Hg      HCO3, Arterial 18.9 (L) mmol/L      Base Excess, Arterial -11.9 (L) mmol/L      O2 Saturation, Arterial 98.1 %      Temperature 37.0 C      Barometric Pressure for Blood Gas 749 mmHg      Modality Ventilator     FIO2 100 %      Ventilator Mode AC     Set Tidal Volume 500     Set Mech Resp Rate 14.0     PEEP 5.0     Notified [facility] [id number]     Notified By [id number]     Notified Time 03/10/2018 08:34     Collected by [id number]    CBC Auto Differential [[id number]]  (Abnormal) Collected:  03/10/18 0700    Specimen:  Blood Updated:  03/10/18 0823     WBC 15.78 (H) 10*3/mm3      RBC 4.93 10*6/mm3      Hemoglobin 13.2 g/dL      Hematocrit 45.2 %      MCV 91.7 fL      MCH 26.8 (L) pg      MCHC 29.2 (L) g/dL      RDW 14.7 %      RDW-SD 49.5 fl      MPV 9.9 fL      Platelets 265 10*3/mm3      Neutrophil % 77.7 %      Lymphocyte % 13.8 (L) %      Monocyte % 5.9 %      Eosinophil % 0.1 %      Basophil % 0.4 %      Immature Grans % 2.1 %      Neutrophils, Absolute 12.26 (H) 10*3/mm3      Lymphocytes, Absolute 2.18 10*3/mm3      Monocytes, Absolute 0.93 10*3/mm3       Eosinophils, Absolute 0.02 10*3/mm3      Basophils, Absolute 0.06 10*3/mm3      Immature Grans, Absolute 0.33 (H) 10*3/mm3      nRBC 0.0 /100 WBC     aPTT [[id number]]  (Normal) Collected:  03/10/18 0744    Specimen:  Blood Updated:  03/10/18 0817     PTT 29.2 seconds     Lactic Acid, Plasma [[id number]]  (Abnormal) Collected:  03/10/18 0743    Specimen:  Blood Updated:  03/10/18 0807     Lactate 7.8 (C) mmol/L     Magnesium [[id number]]  (Abnormal) Collected:  03/10/18 0744    Specimen:  Blood Updated:  03/10/18 0805     Magnesium 2.9 (H) mg/dL     Phosphorus [[id number]]  (Abnormal) Collected:  03/10/18 0744    Specimen:  Blood Updated:  03/10/18 0804     Phosphorus 7.7 (H) mg/dL     Comprehensive Metabolic Panel [[id number]]  (Abnormal) Collected:  03/10/18 0744    Specimen:  Blood Updated:  03/10/18 0804     Glucose 174 (H) mg/dL      BUN 20 mg/dL      Creatinine 1.38 mg/dL      Sodium 147 (H) mmol/L      Potassium 3.3 (L) mmol/L      Chloride 106 mmol/L      CO2 18.0 (L) mmol/L      Calcium 7.1 (L) mg/dL      Total Protein 6.7 g/dL      Albumin 3.40 (L) g/dL      ALT (SGPT) 74 (H) U/L      AST (SGOT) 96 (H) U/L      Alkaline Phosphatase 73 U/L      Total Bilirubin 1.2 (H) mg/dL      eGFR  African Amer 50 (L) mL/min/1.73      Globulin 3.3 gm/dL      A/G Ratio 1.0 (L) g/dL      BUN/Creatinine Ratio 14.5     Anion Gap 23.0 (H) mmol/L     Calcium, Ionized [[id number]]  (Abnormal) Collected:  03/10/18 0610    Specimen:  Blood Updated:  03/10/18 0622     Ionized Calcium 3.93 (L) mg/dL      Collected by [id number]    Blood Gas, Arterial With Co-Ox [[id number]]  (Abnormal) Collected:  03/10/18 0610    Specimen:  Arterial Blood Updated:  03/10/18 0621     Site Arterial Line     Boo's Test N/A     pH, Arterial 7.041 (C) pH units      pCO2, Arterial 63.2 (H) mm Hg      pO2, Arterial 67.0 (L) mm Hg      HCO3, Arterial 17.1 (L) mmol/L      Base Excess, Arterial -14.1 (L) mmol/L      O2 Saturation, Arterial 82.0 (L) %       Hemoglobin, Blood Gas 13.2 (L) g/dL      Hematocrit, Blood Gas 40.5 %      Oxyhemoglobin 80.3 (L) %      Methemoglobin 1.40 %      Carboxyhemoglobin 0.8 %      Temperature 36.0 C      Sodium, Arterial 147 (H) mmol/L      Potassium, Arterial 4.5 mmol/L      Ionized Calcium 3.93 (L) mg/dL      Barometric Pressure for Blood Gas 748 mmHg      Modality Ventilator     FIO2 40 %      Ventilator Mode AC     Set Tidal Volume 500     Set Mech Resp Rate 14.0     PEEP 5.0     Notified Who Dr Bates     Notified By [id number]     Notified Time 03/10/2018 06:20     Collected by [id number]    CBC Auto Differential [[id number]]  (Abnormal) Collected:  03/10/18 [id number]    Specimen:  Blood Updated:  03/10/18 0618     WBC 17.08 (H) 10*3/mm3      RBC 4.70 10*6/mm3      Hemoglobin 13.0 g/dL      Hematocrit 42.5 %      MCV 90.4 fL      MCH 27.7 (L) pg      MCHC 30.6 (L) g/dL      RDW 14.7 %      RDW-SD 48.4 fl      MPV 9.7 fL      Platelets 250 10*3/mm3      Neutrophil % 74.2 %      Lymphocyte % 16.3 %      Monocyte % 7.1 %      Eosinophil % 0.1 %      Basophil % 0.4 %      Immature Grans % 1.9 %      Neutrophils, Absolute 12.66 (H) 10*3/mm3      Lymphocytes, Absolute 2.79 10*3/mm3      Monocytes, Absolute 1.21 10*3/mm3      Eosinophils, Absolute 0.02 10*3/mm3      Basophils, Absolute 0.07 10*3/mm3      Immature Grans, Absolute 0.33 (H) 10*3/mm3      nRBC 0.1 (H) /100 WBC     POC Glucose Once [[id number]]  (Abnormal) Collected:  03/10/18 0530    Specimen:  Blood Updated:  03/10/18 0556     Glucose 159 (H) mg/dL      Comment: : [id number] Juan Carlos Mayorga ID: [id number]       Blood Gas, Arterial [[id number]]  (Abnormal) Collected:  03/10/18 0242    Specimen:  Arterial Blood Updated:  03/10/18 0511     Site Arterial Line     Boo's Test N/A     pH, Arterial 7.293 (L) pH units      pCO2, Arterial 47.8 (H) mm Hg      pO2, Arterial 201.0 (H) mm Hg      HCO3, Arterial 23.1 mmol/L      Base Excess, Arterial -3.7 (L) mmol/L      O2 Saturation,  Arterial 99.9 (H) %      Temperature 37.5 C      Barometric Pressure for Blood Gas 748 mmHg      Modality Ventilator     FIO2 60 %      Ventilator Mode Volume Support     Set Tidal Volume 500     [facility] 14.0     PEEP 5.0     Collected by [id number]    CBC Auto Differential [[id number]]  (Abnormal) Collected:  03/10/18 0150    Specimen:  Blood Updated:  03/10/18 0508     WBC 14.99 (H) 10*3/mm3      RBC 4.54 10*6/mm3      Hemoglobin 12.4 g/dL      Hematocrit 39.2 %      MCV 86.3 fL      MCH 27.3 (L) pg      MCHC 31.6 (L) g/dL      RDW 14.5 %      RDW-SD 45.6 fl      MPV 9.8 fL      Platelets 286 10*3/mm3      Neutrophil % 86.4 (H) %      Lymphocyte % 7.7 (L) %      Monocyte % 4.9 %      Eosinophil % 0.1 %      Basophil % 0.3 %      Immature Grans % 0.6 %      Neutrophils, Absolute 12.96 (H) 10*3/mm3      Lymphocytes, Absolute 1.15 10*3/mm3      Monocytes, Absolute 0.74 10*3/mm3      Eosinophils, Absolute 0.01 10*3/mm3      Basophils, Absolute 0.04 10*3/mm3      Immature Grans, Absolute 0.09 (H) 10*3/mm3      nRBC 0.0 /100 WBC     Lactic Acid, Reflex [[id number]]  (Abnormal) Collected:  03/10/18 0408    Specimen:  Blood Updated:  03/10/18 0457     Lactate 5.2 (C) mmol/L     Comprehensive Metabolic Panel [[id number]]  (Abnormal) Collected:  03/10/18 0343    Specimen:  Blood Updated:  03/10/18 0454     Glucose 188 (H) mg/dL      BUN 19 mg/dL      Creatinine 0.98 mg/dL      Sodium 145 mmol/L      Potassium 3.3 (L) mmol/L      Chloride 105 mmol/L      CO2 24.0 mmol/L      Calcium 8.0 (L) mg/dL      Total Protein 6.7 g/dL      Albumin 3.50 g/dL      ALT (SGPT) 52 U/L      AST (SGOT) 51 (H) U/L      Alkaline Phosphatase 72 U/L      Total Bilirubin 0.8 mg/dL      eGFR  African Amer 74 mL/min/1.73      Globulin 3.2 gm/dL      A/G Ratio 1.1 g/dL      BUN/Creatinine Ratio 19.4     Anion Gap 16.0 (H) mmol/L     Magnesium [[id number]]  (Abnormal) Collected:  03/10/18 0343    Specimen:  Blood Updated:  03/10/18 0455      Magnesium 2.3 (H) mg/dL     Phosphorus [[id number]]  (Abnormal) Collected:  03/10/18 0343    Specimen:  Blood Updated:  03/10/18 0454     Phosphorus 5.4 (H) mg/dL     Lactic Acid, Plasma [[id number]]  (Normal) Collected:  03/10/18 0343    Specimen:  Blood Updated:  03/10/18 0454     Lactate 1.8 mmol/L     POC Glucose Once [[id number]]  (Abnormal) Collected:  03/10/18 0430    Specimen:  Blood Updated:  03/10/18 0441     Glucose 249 (H) mg/dL      Comment: : [id number] Juan Carlos Futura AcorpniferMeter ID: [id number]       Troponin [[id number]]  (Abnormal) Collected:  03/09/18 2257    Specimen:  Blood Updated:  03/10/18 0415     Troponin I 0.146 (H) ng/mL     CK Total & CKMB [[id number]]  (Abnormal) Collected:  03/09/18 2257    Specimen:  Blood Updated:  03/10/18 0415     CKMB 8.15 (H) ng/mL      Creatine Kinase 193 U/L     CK-MB Index [[id number]]  (Normal) Collected:  03/09/18 2257    Specimen:  Blood Updated:  03/10/18 0415     CK-MB Index 4.2 %     hCG, Serum, Qualitative [[id number]]  (Normal) Collected:  03/10/18 0344    Specimen:  Blood Updated:  03/10/18 0401     HCG Qualitative Negative    Protime-INR [[id number]]  (Abnormal) Collected:  03/10/18 0343    Specimen:  Blood Updated:  03/10/18 0354     Protime 14.6 Seconds      INR 1.10 (H)    aPTT [[id number]]  (Normal) Collected:  03/10/18 0343    Specimen:  Blood Updated:  03/10/18 0353     PTT 26.3 seconds     POC Glucose Once [[id number]]  (Abnormal) Collected:  03/10/18 0147    Specimen:  Blood Updated:  03/10/18 0339     Glucose 191 (H) mg/dL      Comment: : [id number] Juan Carlos Futura AcorpniCoradiantMeter ID: [id number]       Lactic Acid, Reflex Timer (This will reflex a repeat order 3-3:15 hours after ordered.) [[id number]] Collected:  03/09/18 2257    Specimen:  Blood Updated:  03/10/18 0231     Extra Tube Hold for add-ons.     Comment: Auto resulted.       POC Glucose Once [[id number]]  (Normal) Collected:  03/10/18 0028    Specimen:  Blood Updated:  03/10/18 0039     Glucose 118 mg/dL       Comment: : [id number] Juan Carlos FairchildMeter ID: [id number]       Blood Gas, Arterial [[id number]]  (Abnormal) Collected:  03/09/18 2350    Specimen:  Arterial Blood Updated:  03/09/18 2359     Site Right Radial     Boo's Test Positive     pH, Arterial 7.337 (L) pH units      pCO2, Arterial 43.7 mm Hg      pO2, Arterial 177.0 (H) mm Hg      HCO3, Arterial 23.4 mmol/L      Base Excess, Arterial -2.5 (L) mmol/L      O2 Saturation, Arterial 99.8 (H) %      Temperature 38.6 C      Barometric Pressure for Blood Gas 747 mmHg      Modality Ventilator     FIO2 80 %      Ventilator Mode AC     Set Tidal Volume 500     Set Mech Resp Rate 14.0     PEEP 5.0     Collected by [id number]    Urinalysis With / Culture If Indicated - Urine, Catheter [[id number]]  (Abnormal) Collected:  03/09/18 2237    Specimen:  Urine from Urine, Catheter Updated:  03/09/18 2349     Color, UA Yellow     Appearance, UA Cloudy (A)     pH, UA 7.0     Specific Gravity, UA 1.019     Glucose,  mg/dL (Trace) (A)     Ketones, UA Negative     Bilirubin, UA Negative     Blood, UA Small (1+) (A)     Protein, UA >=300 mg/dL (3+) (A)     Leuk Esterase, UA Negative     Nitrite, UA Negative     Urobilinogen, UA 0.2 E.U./dL    Urinalysis, Microscopic Only - Urine, Clean Catch [[id number]]  (Abnormal) Collected:  03/09/18 2237    Specimen:  Urine from Urine, Catheter Updated:  03/09/18 2349     RBC, UA 6-12 (A) /HPF      WBC, UA 6-12 (A) /HPF      Bacteria, UA 1+ (A) /HPF      Squamous Epithelial Cells, UA None Seen /HPF      Renal Epithelial Cells, UA 3-6 (A) /HPF      Hyaline Casts, UA 0-2 /LPF      Oval Fat Bodies, UA Small/1+ /HPF      Methodology Manual Light Microscopy     Observations, UA  /HPF      1-3 renal epithelial casts/LPF, 2+ free fat globules    Urine Drug Screen - Urine, Clean Catch [[id number]]  (Normal) Collected:  03/09/18 2237    Specimen:  Urine from Urine, Catheter Updated:  03/09/18 2344     Amphetamine Screen, Urine Negative      Barbiturates Screen, Urine Negative     Benzodiazepine Screen, Urine Negative     Cocaine Screen, Urine Negative     Methadone Screen, Urine Negative     Opiate Screen Negative     Phencyclidine (PCP), Urine Negative     THC, Screen, Urine Negative    Pregnancy, Urine - Urine, Clean Catch [[id number]]  (Normal) Collected:  03/09/18 2237    Specimen:  Urine from Urine, Catheter Updated:  03/09/18 2330     HCG, Urine QL Negative    hCG, Serum, Qualitative [[id number]]  (Normal) Collected:  03/09/18 2257    Specimen:  Blood Updated:  03/09/18 2321     HCG Qualitative Negative    Lactic Acid, Plasma [[id number]]  (Abnormal) Collected:  03/09/18 2257    Specimen:  Blood Updated:  03/09/18 2320     Lactate 3.4 (C) mmol/L     Phosphorus [[id number]]  (Abnormal) Collected:  03/09/18 2257    Specimen:  Blood Updated:  03/09/18 2319     Phosphorus 4.7 (H) mg/dL     TSH [[id number]]  (Normal) Collected:  03/09/18 2146    Specimen:  Blood Updated:  03/09/18 2305     TSH 3.620 mIU/mL     Calcium, Ionized [[id number]]  (Abnormal) Collected:  03/09/18 2254    Specimen:  Blood Updated:  03/09/18 2300     Ionized Calcium 4.46 (L) mg/dL      Collected by Niels Vaca RN    T4, Free [[id number]]  (Normal) Collected:  03/09/18 2146    Specimen:  Blood Updated:  03/09/18 2251     Free T4 1.32 ng/dL     Procalcitonin [[id number]]  (Normal) Collected:  03/09/18 2146    Specimen:  Blood Updated:  03/09/18 2242     Procalcitonin <0.25 ng/mL     CK [[id number]]  (Normal) Collected:  03/09/18 2146    Specimen:  Blood Updated:  03/09/18 2234     Creatine Kinase 176 U/L     Troponin [[id number]]  (Abnormal) Collected:  03/09/18 2146    Specimen:  Blood Updated:  03/09/18 2220     Troponin I 0.059 (H) ng/mL     Ethanol [[id number]]  (Normal) Collected:  03/09/18 2146    Specimen:  Blood Updated:  03/09/18 2220     Ethanol % <0.010 %     Narrative:       Not for legal purposes. Chain of Custody not followed.     CK Total & CKMB [[id number]]  (Normal)  Collected:  03/09/18 2146    Specimen:  Blood Updated:  03/09/18 2220     CKMB 4.23 ng/mL      Creatine Kinase 176 U/L     Narrative:       CKMB Index not indicated    BNP [[id number]]  (Abnormal) Collected:  03/09/18 2146    Specimen:  Blood Updated:  03/09/18 2220     proBNP 4,230.0 (H) pg/mL     Magnesium [[id number]]  (Normal) Collected:  03/09/18 2146    Specimen:  Blood Updated:  03/09/18 2214     Magnesium 2.1 mg/dL      Comment: Specimen hemolyzed.  Results may be affected.       Comprehensive Metabolic Panel [[id number]]  (Abnormal) Collected:  03/09/18 2146    Specimen:  Blood Updated:  03/09/18 2213     Glucose 180 (H) mg/dL      BUN 18 mg/dL      Comment: Specimen hemolyzed. Results may be affected.        Creatinine 1.14 mg/dL      Sodium 142 mmol/L      Potassium 3.1 (L) mmol/L      Comment: Specimen hemolyzed.  Results may be affected.        Chloride 101 mmol/L      CO2 25.0 mmol/L      Calcium 8.5 mg/dL      Total Protein 7.6 g/dL      Albumin 4.00 g/dL      ALT (SGPT) 50 U/L      Comment: Specimen hemolyzed.  Results may be affected.        AST (SGOT) 53 (H) U/L      Comment: Specimen hemolyzed.  Results may be affected.        Alkaline Phosphatase 84 U/L      Comment: Specimen hemolyzed. Results may be affected.        Total Bilirubin 0.8 mg/dL      eGFR   Amer 62 mL/min/1.73      Globulin 3.6 gm/dL      A/G Ratio 1.1 g/dL      BUN/Creatinine Ratio 15.8     Anion Gap 16.0 (H) mmol/L     Protime-INR [[id number]]  (Normal) Collected:  03/09/18 2146    Specimen:  Blood Updated:  03/09/18 2207     Protime 14.5 Seconds      INR 1.09    aPTT [[id number]]  (Normal) Collected:  03/09/18 2146    Specimen:  Blood Updated:  03/09/18 2207     PTT 25.4 seconds     CBC Auto Differential [[id number]]  (Abnormal) Collected:  03/09/18 2146    Specimen:  Blood Updated:  03/09/18 2158     WBC 20.13 (H) 10*3/mm3      RBC 4.80 10*6/mm3      Hemoglobin 13.1 g/dL      Hematocrit 42.5 %      MCV 88.5 fL      MCH 27.3  (L) pg      MCHC 30.8 (L) g/dL      RDW 14.4 %      RDW-SD 46.5 fl      MPV 9.7 fL      Platelets 313 10*3/mm3      Neutrophil % 84.2 (H) %      Lymphocyte % 10.2 (L) %      Monocyte % 3.4 (L) %      Eosinophil % 0.6 %      Basophil % 0.3 %      Immature Grans % 1.3 %      Neutrophils, Absolute 16.94 (H) 10*3/mm3      Lymphocytes, Absolute 2.06 10*3/mm3      Monocytes, Absolute 0.68 10*3/mm3      Eosinophils, Absolute 0.12 10*3/mm3      Basophils, Absolute 0.07 10*3/mm3      Immature Grans, Absolute 0.26 (H) 10*3/mm3      nRBC 0.0 /100 WBC     Blood Gas, Arterial [[id number]]  (Abnormal) Collected:  03/09/18 2255    Specimen:  Arterial Blood Updated:  03/09/18 2155     Site Right Radial     Boo's Test Positive     pH, Arterial 7.271 (L) pH units      pCO2, Arterial 48.4 (H) mm Hg      pO2, Arterial 118.0 (H) mm Hg      HCO3, Arterial 22.3 mmol/L      Base Excess, Arterial -4.9 (L) mmol/L      O2 Saturation, Arterial 98.0 %      Temperature 37.0 C      Barometric Pressure for Blood Gas 748 mmHg      Modality Ventilator     FIO2 100 %      Ventilator Mode AC     Set Tidal Volume 470     Set Mech Resp Rate 14.0     PEEP 5.0     Collected by [id number]        Imaging Results (last 7 days)     Procedure Component Value Units Date/Time    XR Chest 1 View [[id number]] Collected:  03/11/18 0716     Updated:  03/11/18 0720    Narrative:       EXAMINATION:   XR CHEST 1 VW-  3/11/2018 6:16 AM CST     HISTORY: On ventilator     Frontal upright radiograph of the chest 3/11/2018 4:58 AM CST     COMPARISON: March 10, 2018.     FINDINGS:   There is extensive air space filling infiltrate in the right lung. This  has appearance of pulmonary edema. This is a change from prior exam.  Cardiac silhouettes markedly enlarged. Endotracheal tube and nasogastric  tubes are present..      The osseous structures and surrounding soft tissues demonstrate no acute  abnormality.       Impression:       1. Marked cardiomegaly extensive infiltrate right  lung most likely  representing congestive failure..        This report was finalized on 03/11/2018 07:17 by Dr. Freedom Vásquez MD.    XR Chest 1 View [[id number]] Collected:  03/10/18 0714     Updated:  03/10/18 0719    Narrative:       EXAMINATION:   XR CHEST 1 VW-  3/10/2018 7:14 AM CST     HISTORY: Baseline     Frontal upright radiograph of the chest 3/10/2018 3:15 AM CST     COMPARISON: March 9, 2018.     FINDINGS:   Right perihilar infiltrates present. There is atelectasis left lower  lobe.. Cardiac silhouettes enlarged and the findings are most consistent  with pulmonary vascular congestion. Endotracheal tube is present..      The osseous structures and surrounding soft tissues demonstrate no acute  abnormality.       Impression:       1. Cardiomegaly bilateral infiltrates most likely representing pulmonary  vascular congestion.  2. Atelectasis left lower lobe is now present..        This report was finalized on 03/10/2018 07:16 by Dr. Freedom Vásquez MD.    CT Abdomen Pelvis With Contrast [[id number]] Collected:  03/09/18 2229     Updated:  03/09/18 2238    Narrative:       CT ABDOMEN PELVIS W CONTRAST- 3/9/2018 9:11 PM CST     HISTORY: cardiac arrest       COMPARISON: None.      DLP: 2204 mGy cm. Automated exposure control was utilized to diminish  patient radiation dose.     TECHNIQUE: Following the intravenous administration of contrast, helical  CT tomographic images of the abdomen and pelvis were acquired. Coronal  reformatted images were also provided for review.      FINDINGS:   Bilateral lower lobe airspace disease is present for which I would favor  atelectasis. Early pulmonary edema may also contribute to this finding.  There is moderate cardiomegaly. No evidence of pericardial effusion..      LIVER: No focal liver lesion. The hepatic vasculature is patent.      BILIARY SYSTEM: The gallbladder is unremarkable. No intrahepatic or  extrahepatic ductal dilatation.      PANCREAS: No focal pancreatic lesion.       SPLEEN: Unremarkable.      KIDNEYS AND ADRENALS: Bilateral kidneys and adrenal glands are  unremarkable. The ureters are decompressed and normal in appearance.     RETROPERITONEUM: No mass, lymphadenopathy or hemorrhage.      GI TRACT: No evidence of obstruction or bowel wall thickening. The  appendix is visualized and unremarkable.     OTHER: There is no mesenteric mass, lymphadenopathy or fluid collection.  The abdominopelvic vasculature is patent. The osseous structures and  soft tissues demonstrate no worrisome lesions. There is a small amount  of free fluid within the pelvis. There is some stranding within the  subcutaneous tissue suggests there may be some third spacing of fluid.  The patient is facet status post hysterectomy.      PELVIS: No evidence of mass. The patient is status post hysterectomy..  The urinary bladder is normal in appearance.       Impression:       1. Patchy bilateral airspace disease within the lower lobes. This is in  part related atelectasis but some superimposed pulmonary edema is also a  differential. There is some free fluid within the pelvis as well as some  stranding within the subcutaneous tissue suggesting that there may be  some third spacing of fluid within the subcutaneous tissues suggesting  developing anasarca.  2. The soft tissue organs are unremarkable. The abdominal aorta is  normal in caliber with no evidence of dissection or aneurysm..      3. Moderate cardiomegaly. No evidence of pericardial effusion.     This report was finalized on 03/09/2018 22:35 by Dr. Pravin Jean-Baptiste MD.    CT Chest With Contrast [[id number]] Collected:  03/09/18 2223     Updated:  03/09/18 2232    Narrative:       CT CHEST W CONTRAST- 3/9/2018 9:11 PM CST     HISTORY: cardiac arrest      COMPARISON: None      DLP: 554 mGy cm. Automated exposure control was utilized to diminish  patient radiation dose.     TECHNIQUE: Serial helical tomographic images of the chest were acquired  following  the infusion of Isovue contrast intravenously. Bone and soft  tissue algorithms were provided.       FINDINGS:   Neck base: The imaged portion of the neck and thyroid gland is  unremarkable. The patient is intubated with endotracheal tube  well-positioned.     Lungs: There is patchy bilateral airspace disease. The disease within  the lower lobes I suspect represents some atelectasis related to a poor  inspiratory effort. However, the disease within the perihilar aspect of  both lungs and both upper lobes could be related to atelectasis or some  early developing pulmonary edema. Aspiration pneumonia would also be a  differential.. No pleural effusion is present. The trachea and bronchial  tree are patent.      Heart: There is moderate cardiomegaly.. There is no pericardial  effusion.      Vasculature: Aorta is normal in caliber and appearance without  significant atherosclerosis, stenosis, or aneurysm. No coronary  arteriosclerosis identified. The great vessels are normal in appearance.  The pulmonary arteries are normal in appearance.     Lymph nodes: No enlarged axillary, hilar, or mediastinal lymph nodes.      Bones and soft tissues: No acute osseous or soft tissue abnormality is  seen. There are no worrisome osseous lesions.      Upper abdomen: The imaged portion of the upper abdomen is unremarkable.        Impression:       1. Bilateral airspace disease some of which is related to a poor  inspiratory effort. The patient is intubated. Upper lobe and perihilar  airspace disease could represent some early developing pulmonary edema.  No effusions are present. There is moderate cardiomegaly.  2. The thoracic aorta is normal in caliber with no definite dissection  or aneurysm. The proximal great vessels are unremarkable..        This report was finalized on 03/09/2018 22:28 by Dr. Pravin Jean-Baptiste MD.    CT Cervical Spine Without Contrast [[id number]] Collected:  03/09/18 2153     Updated:  03/09/18 2159    Narrative:        CT CERVICAL SPINE WO CONTRAST- 3/9/2018 8:53 PM CST     HISTORY: cardiac arrest     COMPARISON: None      DOSE LENGTH PRODUCT: 364 mGy cm. Automated exposure control was utilized  to diminish patient radiation dose.     TECHNIQUE: Serial helical tomographic images of the cervical spine were  obtained without the use of intravenous contrast. Additionally, sagittal  and coronal reformatted images were also provided for review. The study  is markedly degraded by motion artifact as well as beam hardening.     FINDINGS:   Alignment: Normal.     Bones: There is no evidence of fracture. Vertebral body heights are  maintained.     Disc spaces: Degenerative disc disease is noted at C4-C5, C5-C6 and  C6-C7 with narrowing of the space height and spondylosis.     Canal and neuroforamina: Bilateral neural foraminal narrowing is noted  at C5-C6 related uncinate spurring.     Soft tissues: Unremarkable.     Lung apices: Patchy groundglass disease is noted within the upper lobes  suggesting interstitial edema.       Impression:       1. Arthritic changes. No evidence of acute osseous injury or  malalignment. The study is degraded by motion.  2. Groundglass disease within the upper lung apices suspicious for  interstitial pulmonary edema.        This report was finalized on 03/09/2018 21:56 by Dr. Pravin Jean-Baptiste MD.    XR Chest 1 View [[id number]] Collected:  03/09/18 2152     Updated:  03/09/18 2156    Narrative:       EXAMINATION: Chest one view 3/9/2018     HISTORY: Postintubation     FINDINGS: Today's exam is compared to previous study of 12/4/2017. The  patient has been intubated with endotracheal tube tip well-positioned.  There is cardiomegaly with pulmonary vascular congestion suggesting  developing pulmonary venous hypertension and early pulmonary edema.       Impression:       1.. Marked cardiomegaly with pulmonary vascular congestion.  2. Endotracheal tube well positioned.  This report was finalized on 03/09/2018  21:53 by Dr. Pravin Jean-Baptiste MD.    CT Head Without Contrast [[id number]] Collected:  03/09/18 2147     Updated:  03/09/18 2154    Narrative:       EXAMINATION: CT head without contrast 3/9/2018     HISTORY: Cardiac arrest.     DOSE: 1079 gray centimeter. Automated exposure control was utilized to  diminish patient radiation dose..     FINDINGS: Multiple contiguous axial images are obtained from the skull  base to the vertex per protocol without intravenous contrast-enhancement  with reformatted images obtained in the sagittal and coronal  projections. The study is limited as the patient was unable to cooperate  with the exam. We attempted scanning the patient several times. I do not  see gross evidence of mass effect or shift of the midline. No gross  evidence of hemorrhage. No shift of the midline structures. No acute  calvarial abnormalities are present. The orbits are grossly  unremarkable. The visualized paranasal sinuses and mastoid air cells are  clear.       Impression:       1.. Limited exam secondary to extensive motion artifact even after  repeating the sequence multiple times.  2. Grossly unremarkable nonenhanced CT of the brain.  This report was finalized on 03/09/2018 21:51 by Dr. Pravin Jean-Baptiste MD.      Echocardiogram:  Interpretation Summary        · The left ventricular cavity is moderately dilated.  · Left ventricular wall thickness is consistent with mild concentric hypertrophy.  · Mild to moderate tricuspid valve regurgitation is present.  · Calculated right ventricular systolic pressure from tricuspid regurgitation is 22 mmHg.  · The findings are consistent with dilated cardiomyopathy.  · Left ventricular systolic function is severely decreased.  · Left atrial cavity size is mildly dilated.  · Right ventricular cavity is mildly dilated.     DILATED CARDIOMYOPATHY - FOUR CHAMBER ENLARGEMENT  SEVERE GLOBAL LV HYPOKINESIS  MILD TO MODERATE TR WITHOUT PULMONARY HTN           Hospital Course:  The  patient is a 47 y.o. female who presented to [facility] with witnessed cardiac arrest.  The patient [place] apparently [place] a bowling [place] with friends and she expressed a cardiac arrest.  [facility].  EMS resume CPR and transfer the patient to our facility.  Total down time exceeded 30 minutes.  She received 2 doses of epinephrine, and cardioversion and Narcan administration with return of spontaneous circulation.  A cooling protocol was ordered.  Chest x-ray showed significant cardiomegaly.  There were groundglass opacities within the upper lung fields suggestive of pulmonary edema.  The patient was subsequently transferred to the critical care unit where cooling protocol was continued.  The patient continued to experience progressive hypotension requiring 3 pressors.  About 6-1/2 hours after arrival to the critical care unit the patient went into PEA requiring multiple rounds of epinephrine, atropine and bicarbonate.  An epinephrine drip was ultimately initiated and the patient's blood pressure stabilized.  The patient was sedated with propofol.  Lactic acid continued to rise throughout the patient's hospital and her pH remained quite low dropping as low as 7.08.  A stat echocardiogram was ordered showing a dilated cardiomyopathy with 4 chamber enlargement, severe global left ventricular hypokinesis, and ejection fraction estimated at 20% and mild to moderate tricuspid regurgitation.  Pulmonology was consulted for management of the ventilator.  The patient's pupils remained pinpoint and fixed throughout the hospital stay highly suggestive of anoxic brain injury.  No spontaneous movements were noted prior to sedation.  The white blood cell count continued to elevate through the hospital stay into the lower 20,000 range and she remained consistently with metabolic acidosis and hypoxemia.  Hepatic transaminases continued to drift upward significantly  and her creatinine drifted upward as well.  I had a full discussion with the patient's family and explained the full extent of the patient's condition.  The patient began to experience significant fluid retention and generalized edema.  There was little to no response from IV diuretics.  Cardiology was consulted and ordered a 6 every 12 hours and felt that the patient was unlikely to survive.  Later that evening the patient's daughter made the decision to remove life support and the patient passed away at 2346 hrs on 03/11/2018.        Thierno Winchester DO  03/12/18  5:50 PM    Time: less than 30 minutes

## 2018-03-12 NOTE — PAYOR COMM NOTE
"Stephen Telles (Dcsd. Female)     Date of Birth Social Security Number Address Home Phone MRN    1970  [address]  [place] [address]  [id number]    Mu-ism Marital Status          None Single       Admission Date Admission Type Admitting Provider Attending Provider Department, Room/Bed    3/9/18 Emergency Thierno Winchester DO  [facility] CARDIAC CARE, C012/1    Discharge Date Discharge Disposition Discharge Destination        3/12/2018               Attending Provider:  (none)   Allergies:  No Known Allergies    Isolation:  None   Infection:  None   Code Status:  Prior    Ht:  162.6 cm (64\")   Wt:  161 kg (355 lb 4.8 oz)    Admission Cmt:  None   Principal Problem:  Cardiac arrest [I46.9]                 Active Insurance as of 3/9/2018     Primary Coverage     Payor Plan Insurance Group Employer/Plan Group    [employer] P[employer] [id number]     Payor Plan Address Payor Plan Phone Number Effective From Effective To    [address] [phone] [address]     [place] [address]       Subscriber Name Subscriber Birth Date Member ID       STEPHEN TELLES 1970 [id number]                 Emergency Contacts      (Rel.) Home Phone Work Phone Mobile Phone    Tamar Telles (Daughter) -- -- [phone]               History & Physical      Sanchez Bates MD at 3/10/2018 12:43 AM              [facility]  HISTORY AND PHYSICAL    Date of Admission: 3/9/2018  Primary Care Physician: SB Michel    Subjective     Chief Complaint: Cardiac arrest    History of Present Illness  Patient presents in cardiac arrest.        Review of Systems   14 point review of systems negative except for as per HPI    Otherwise complete ROS reviewed and negative except as mentioned in the HPI.    Past Medical History:   Past Medical History:   Diagnosis Date   • Hypertension    • Sleep apnea  " "    Past Surgical History:  Past Surgical History:   Procedure Laterality Date   • HYSTERECTOMY       Social History:  reports that she has never smoked. She does not have any smokeless tobacco history on file. Alcohol use questions deferred to the physician. Drug use questions deferred to the physician.    Family History: family history is not on file.   Unable to obtain    Allergies:  No Known Allergies  Medications:  Prior to Admission medications    Not on File     Objective     Vital Signs: BP (!) 182/141  Pulse (!) 123  Temp (!) 100.8 °F (38.2 °C) (Core)   Resp 14  Ht 167.6 cm (66\")  Wt (!) 159 kg (350 lb)  SpO2 100%  BMI 56.49 kg/m2  Physical Exam  Constitutional: She appears well-developed and well-nourished.   HENT:   Head: Normocephalic and atraumatic.   Right Ear: External ear normal.   Left Ear: External ear normal.   Mouth/Throat: Oropharynx is clear and moist.   Eyes:   Pinpoint pupils non reactive  Neck: Normal range of motion. Neck supple.   Cardiovascular: Regular rhythm, normal heart sounds and intact distal pulses.    tachycardia   Pulmonary/Chest: Breath sounds normal.   No spontanous breathing, breathsounds bilterally with bagging.    Abdominal: Soft. Bowel sounds are normal.   ? Left sided hernia vs lipoma felt in the upper abdomen.    Musculoskeletal: She exhibits no edema or tenderness.   Neurological:   gcs 3 T    Has no corneal, no gag no reflexes, no clonus.    Skin: Skin is warm.   Vitals reviewed      Results Reviewed:  Lab Results (last 24 hours)     Procedure Component Value Units Date/Time    Blood Gas, Arterial [[id number]]  (Abnormal) Collected:  03/09/18 2255    Specimen:  Arterial Blood Updated:  03/09/18 2155     Site Right Radial     Boo's Test Positive     pH, Arterial 7.271 (L) pH units      pCO2, Arterial 48.4 (H) mm Hg      pO2, Arterial 118.0 (H) mm Hg      HCO3, Arterial 22.3 mmol/L      Base Excess, Arterial -4.9 (L) mmol/L      O2 Saturation, Arterial 98.0 %      " Temperature 37.0 C      Barometric Pressure for Blood Gas 748 mmHg      Modality Ventilator     FIO2 100 %      Ventilator Mode AC     Set Tidal Volume 470     [facility] 14.[facility]     PEEP 5.0     Collected by [id number]    CBC & Differential [[id number]] Collected:  03/09/18 2146    Specimen:  Blood Updated:  03/09/18 2158    Narrative:       The following orders were created for panel order CBC & Differential.  Procedure                               Abnormality         Status                     ---------                               -----------         ------                     CBC Auto Differential[[id number]]        Abnormal            Final result                 Please view results for these tests on the individual orders.    CBC Auto Differential [[id number]]  (Abnormal) Collected:  03/09/18 2146    Specimen:  Blood Updated:  03/09/18 2158     WBC 20.13 (H) 10*3/mm3      RBC 4.80 10*6/mm3      Hemoglobin 13.1 g/dL      Hematocrit 42.5 %      MCV 88.5 fL      MCH 27.3 (L) pg      MCHC 30.8 (L) g/dL      RDW 14.4 %      RDW-SD 46.5 fl      MPV 9.7 fL      Platelets 313 10*3/mm3      Neutrophil % 84.2 (H) %      Lymphocyte % 10.2 (L) %      Monocyte % 3.4 (L) %      Eosinophil % 0.6 %      Basophil % 0.3 %      Immature Grans % 1.3 %      Neutrophils, Absolute 16.94 (H) 10*3/mm3      Lymphocytes, Absolute 2.06 10*3/mm3      Monocytes, Absolute 0.68 10*3/mm3      Eosinophils, Absolute 0.12 10*3/mm3      Basophils, Absolute 0.07 10*3/mm3      Immature Grans, Absolute 0.26 (H) 10*3/mm3      nRBC 0.0 /100 WBC     Protime-INR [[id number]]  (Normal) Collected:  03/09/18 2146    Specimen:  Blood Updated:  03/09/18 2207     Protime 14.5 Seconds      INR 1.09    aPTT [[id number]]  (Normal) Collected:  03/09/18 2146    Specimen:  Blood Updated:  03/09/18 2207     PTT 25.4 seconds     Comprehensive Metabolic Panel [[id number]]  (Abnormal) Collected:  03/09/18 2146    Specimen:  Blood Updated:  03/09/18 2213     Glucose 180  (H) mg/dL      BUN 18 mg/dL       Specimen hemolyzed. Results may be affected.        Creatinine 1.14 mg/dL      Sodium 142 mmol/L      Potassium 3.1 (L) mmol/L       Specimen hemolyzed.  Results may be affected.        Chloride 101 mmol/L      CO2 25.0 mmol/L      Calcium 8.5 mg/dL      Total Protein 7.6 g/dL      Albumin 4.00 g/dL      ALT (SGPT) 50 U/L       Specimen hemolyzed.  Results may be affected.        AST (SGOT) 53 (H) U/L       Specimen hemolyzed.  Results may be affected.        Alkaline Phosphatase 84 U/L       Specimen hemolyzed. Results may be affected.        Total Bilirubin 0.8 mg/dL      eGFR   Amer 62 mL/min/1.73      Globulin 3.6 gm/dL      A/G Ratio 1.1 g/dL      BUN/Creatinine Ratio 15.8     Anion Gap 16.0 (H) mmol/L     Magnesium [[id number]]  (Normal) Collected:  03/09/18 2146    Specimen:  Blood Updated:  03/09/18 2214     Magnesium 2.1 mg/dL       Specimen hemolyzed.  Results may be affected.       BNP [[id number]]  (Abnormal) Collected:  03/09/18 2146    Specimen:  Blood Updated:  03/09/18 2220     proBNP 4230.0 (H) pg/mL     Troponin [[id number]]  (Abnormal) Collected:  03/09/18 2146    Specimen:  Blood Updated:  03/09/18 2220     Troponin I 0.059 (H) ng/mL     Ethanol [[id number]]  (Normal) Collected:  03/09/18 2146    Specimen:  Blood Updated:  03/09/18 2220     Ethanol % <0.010 %     Narrative:       Not for legal purposes. Chain of Custody not followed.     CK Total & CKMB [[id number]]  (Normal) Collected:  03/09/18 2146    Specimen:  Blood Updated:  03/09/18 2220     CKMB 4.23 ng/mL      Creatine Kinase 176 U/L     Narrative:       CKMB Index not indicated    CK [[id number]]  (Normal) Collected:  03/09/18 2146    Specimen:  Blood Updated:  03/09/18 2234     Creatine Kinase 176 U/L     Procalcitonin [[id number]]  (Normal) Collected:  03/09/18 2146    Specimen:  Blood Updated:  03/09/18 2242     Procalcitonin <0.25 ng/mL     Narrative:       SIRS, sepsis, severe sepsis, and septic  shock are categorized according to the criteria of the consensus conference of the American College of Chest Physicians/Society of Critical Care Medicine.    PCT < 0.5 ng/mL     Systemic infection (sepsis) is not likely.    PCT >0.5 and < 2.0 ng/mL Systemic infection (sepsis) is possible, but other conditions are known to elevate PCT as well.    PCT > 2.0 ng/mL     Systemic infection (sepsis) is likely, unless other causes are known.      PCT > 10.0 ng/mL    Important systemic inflammatory response, almost exclusively due to severe bacterial sepsis or septic shock.    PCT values of < 0.5 ng/mL do not exclude an infection, because localized infections (without systemic signs) may be associated with such low concentrations, or a systemic infection in its initial stages (<6 hours).  Increased PCT can occur without infection.  PCT concentrations between 0.5 and 2.0 ng/mL should be interpreted taking into account the patients history.  It is recommended to retest PCT within 6-24 hours if any concentrations < 2.0 ng/mL are obtained.    T4, Free [[id number]]  (Normal) Collected:  03/09/18 2146    Specimen:  Blood Updated:  03/09/18 2251     Free T4 1.32 ng/dL     Calcium, Ionized [[id number]]  (Abnormal) Collected:  03/09/18 2254    Specimen:  Blood Updated:  03/09/18 2300     Ionized Calcium 4.46 (L) mg/dL      Collected by Niels Vaca RN    TSH [[id number]]  (Normal) Collected:  03/09/18 2146    Specimen:  Blood Updated:  03/09/18 2305     TSH 3.620 mIU/mL     [place] Draw [[id number]] Collected:  03/09/18 2146    Specimen:  Blood Updated:  03/09/18 2306    Narrative:       The following orders were created for panel order [place] Draw.  Procedure                               Abnormality         Status                     ---------                               -----------         ------                     Light Blue Top[[id number]]                                    Final result               Green Top (Gel)[[id number]]                                    Final result               Lavender Top[[id number]]                                      Final result               Red Top[[id number]]                                           Final result                 Please view results for these tests on the individual orders.    Light Blue Top [[id number]] Collected:  03/09/18 2146    Specimen:  Blood Updated:  03/09/18 2306     Extra Tube hold for add-on      Auto resulted       Green Top (Gel) [[id number]] Collected:  03/09/18 2146    Specimen:  Blood Updated:  03/09/18 2306     Extra Tube Hold for add-ons.      Auto resulted.       Lavender Top [[id number]] Collected:  03/09/18 2146    Specimen:  Blood Updated:  03/09/18 2306     Extra Tube hold for add-on      Auto resulted       Red Top [[id number]] Collected:  03/09/18 2146    Specimen:  Blood Updated:  03/09/18 2306     Extra Tube Hold for add-ons.      Auto resulted.       Urine Culture - Urine, Urine, Clean Catch [[id number]] Collected:  03/09/18 2237    Specimen:  Urine from Urine, Catheter Updated:  03/09/18 2317    Phosphorus [[id number]]  (Abnormal) Collected:  03/09/18 2257    Specimen:  Blood Updated:  03/09/18 2319     Phosphorus 4.7 (H) mg/dL     Lactic Acid, Reflex Timer (This will reflex a repeat order 3-3:15 hours after ordered.) [[id number]] Collected:  03/09/18 2257    Specimen:  Blood Updated:  03/09/18 2320    Lactic Acid, Plasma [[id number]]  (Abnormal) Collected:  03/09/18 2257    Specimen:  Blood Updated:  03/09/18 2320     Lactate 3.4 (C) mmol/L     hCG, Serum, Qualitative [[id number]]  (Normal) Collected:  03/09/18 2257    Specimen:  Blood Updated:  03/09/18 2321     HCG Qualitative Negative    Pregnancy, Urine - Urine, Clean Catch [[id number]]  (Normal) Collected:  03/09/18 2237    Specimen:  Urine from Urine, Catheter Updated:  03/09/18 2330     HCG, Urine QL Negative    Urine Drug Screen - Urine, Clean Catch [[id number]]  (Normal) Collected:  03/09/18 2237    Specimen:  Urine  from Urine, Catheter Updated:  03/09/18 2344     Amphetamine Screen, Urine Negative     Barbiturates Screen, Urine Negative     Benzodiazepine Screen, Urine Negative     Cocaine Screen, Urine Negative     Methadone Screen, Urine Negative     Opiate Screen Negative     Phencyclidine (PCP), Urine Negative     THC, Screen, Urine Negative    Narrative:       Negative Thresholds For Drugs Screened in Urine:    Amphetamines          500 ng/ml  Barbiturates          200 ng/ml  Benzodiazepines       200 ng/ml  Cocaine               150 ng/ml  Methadone             150 ng/ml  Opiates               300 ng/ml  Phencyclidine         25 ng/ml  THC                      50 ng/ml    The normal value for all drugs tested is negative. This report includes final unconfirmed screening results.  A positive result by this assay can be, at your request, sent to the Reference Lab for confirmation by gas chromatography. Unconfirmed results must not be used for non-medical purposes, such as employment or legal testing. Clinical consideration should be applied to any drug of abuse test result, particularly when unconfirmed results are used.    Urinalysis With / Culture If Indicated - Urine, Catheter [[id number]]  (Abnormal) Collected:  03/09/18 2237    Specimen:  Urine from Urine, Catheter Updated:  03/09/18 2349     Color, UA Yellow     Appearance, UA Cloudy (A)     pH, UA 7.0     Specific Gravity, UA 1.019     Glucose,  mg/dL (Trace) (A)     Ketones, UA Negative     Bilirubin, UA Negative     Blood, UA Small (1+) (A)     Protein, UA >=300 mg/dL (3+) (A)     Leuk Esterase, UA Negative     Nitrite, UA Negative     Urobilinogen, UA 0.2 E.U./dL    Urinalysis, Microscopic Only - Urine, Clean Catch [[id number]]  (Abnormal) Collected:  03/09/18 2237    Specimen:  Urine from Urine, Catheter Updated:  03/09/18 2349     RBC, UA 6-12 (A) /HPF      WBC, UA 6-12 (A) /HPF      Bacteria, UA 1+ (A) /HPF      Squamous Epithelial Cells, UA None Seen /HPF       Renal Epithelial Cells, UA 3-6 (A) /HPF      Hyaline Casts, UA 0-2 /LPF      Oval Fat Bodies, UA Small/1+ /HPF      Methodology Manual Light Microscopy     Observations, UA  /HPF      1-3 renal epithelial casts/LPF, 2+ free fat globules    Blood Gas, Arterial [[id number]]  (Abnormal) Collected:  03/09/18 2350    Specimen:  Arterial Blood Updated:  03/09/18 2359     Site Right Radial     Boo's Test Positive     pH, Arterial 7.337 (L) pH units      pCO2, Arterial 43.7 mm Hg      pO2, Arterial 177.0 (H) mm Hg      HCO3, Arterial 23.4 mmol/L      Base Excess, Arterial -2.5 (L) mmol/L      O2 Saturation, Arterial 99.8 (H) %      Temperature 38.6 C      Barometric Pressure for Blood Gas 747 mmHg      Modality Ventilator     FIO2 80 %      Ventilator Mode AC     Set Tidal Volume 500     Set Mech Resp Rate 14.0     PEEP 5.0     Collected by [id number]    POC Glucose Once [[id number]]  (Normal) Collected:  03/10/18 0028    Specimen:  Blood Updated:  03/10/18 0039     Glucose 118 mg/dL       : [id number] Juan Carlos AndersonferMeter ID: [id number]           Imaging Results (last 24 hours)     Procedure Component Value Units Date/Time    CT Head Without Contrast [[id number]] Collected:  03/09/18 2147     Updated:  03/09/18 2154    Narrative:       EXAMINATION: CT head without contrast 3/9/2018     HISTORY: Cardiac arrest.     DOSE: 1079 gray centimeter. Automated exposure control was utilized to  diminish patient radiation dose..     FINDINGS: Multiple contiguous axial images are obtained from the skull  base to the vertex per protocol without intravenous contrast-enhancement  with reformatted images obtained in the sagittal and coronal  projections. The study is limited as the patient was unable to cooperate  with the exam. We attempted scanning the patient several times. I do not  see gross evidence of mass effect or shift of the midline. No gross  evidence of hemorrhage. No shift of the midline structures. No acute  calvarial  abnormalities are present. The orbits are grossly  unremarkable. The visualized paranasal sinuses and mastoid air cells are  clear.       Impression:       1.. Limited exam secondary to extensive motion artifact even after  repeating the sequence multiple times.  2. Grossly unremarkable nonenhanced CT of the brain.  This report was finalized on 03/09/2018 21:51 by Dr. Pravin Jean-Baptiste MD.    XR Chest 1 View [[id number]] Collected:  03/09/18 2152     Updated:  03/09/18 2156    Narrative:       EXAMINATION: Chest one view 3/9/2018     HISTORY: Postintubation     FINDINGS: Today's exam is compared to previous study of 12/4/2017. The  patient has been intubated with endotracheal tube tip well-positioned.  There is cardiomegaly with pulmonary vascular congestion suggesting  developing pulmonary venous hypertension and early pulmonary edema.       Impression:       1.. Marked cardiomegaly with pulmonary vascular congestion.  2. Endotracheal tube well positioned.  This report was finalized on 03/09/2018 21:53 by Dr. Pravin Jean-Baptiste MD.    CT Cervical Spine Without Contrast [[id number]] Collected:  03/09/18 2153     Updated:  03/09/18 2159    Narrative:       CT CERVICAL SPINE WO CONTRAST- 3/9/2018 8:53 PM CST     HISTORY: cardiac arrest     COMPARISON: None      DOSE LENGTH PRODUCT: 364 mGy cm. Automated exposure control was utilized  to diminish patient radiation dose.     TECHNIQUE: Serial helical tomographic images of the cervical spine were  obtained without the use of intravenous contrast. Additionally, sagittal  and coronal reformatted images were also provided for review. The study  is markedly degraded by motion artifact as well as beam hardening.     FINDINGS:   Alignment: Normal.     Bones: There is no evidence of fracture. Vertebral body heights are  maintained.     Disc spaces: Degenerative disc disease is noted at C4-C5, C5-C6 and  C6-C7 with narrowing of the space height and spondylosis.     Canal and  neuroforamina: Bilateral neural foraminal narrowing is noted  at C5-C6 related uncinate spurring.     Soft tissues: Unremarkable.     Lung apices: Patchy groundglass disease is noted within the upper lobes  suggesting interstitial edema.       Impression:       1. Arthritic changes. No evidence of acute osseous injury or  malalignment. The study is degraded by motion.  2. Groundglass disease within the upper lung apices suspicious for  interstitial pulmonary edema.        This report was finalized on 03/09/2018 21:56 by Dr. Pravin Jean-Baptiste MD.    CT Chest With Contrast [[id number]] Collected:  03/09/18 2223     Updated:  03/09/18 2232    Narrative:       CT CHEST W CONTRAST- 3/9/2018 9:11 PM CST     HISTORY: cardiac arrest      COMPARISON: None      DLP: 554 mGy cm. Automated exposure control was utilized to diminish  patient radiation dose.     TECHNIQUE: Serial helical tomographic images of the chest were acquired  following the infusion of Isovue contrast intravenously. Bone and soft  tissue algorithms were provided.       FINDINGS:   Neck base: The imaged portion of the neck and thyroid gland is  unremarkable. The patient is intubated with endotracheal tube  well-positioned.     Lungs: There is patchy bilateral airspace disease. The disease within  the lower lobes I suspect represents some atelectasis related to a poor  inspiratory effort. However, the disease within the perihilar aspect of  both lungs and both upper lobes could be related to atelectasis or some  early developing pulmonary edema. Aspiration pneumonia would also be a  differential.. No pleural effusion is present. The trachea and bronchial  tree are patent.      Heart: There is moderate cardiomegaly.. There is no pericardial  effusion.      Vasculature: Aorta is normal in caliber and appearance without  significant atherosclerosis, stenosis, or aneurysm. No coronary  arteriosclerosis identified. The great vessels are normal in appearance.  The  pulmonary arteries are normal in appearance.     Lymph nodes: No enlarged axillary, hilar, or mediastinal lymph nodes.      Bones and soft tissues: No acute osseous or soft tissue abnormality is  seen. There are no worrisome osseous lesions.      Upper abdomen: The imaged portion of the upper abdomen is unremarkable.        Impression:       1. Bilateral airspace disease some of which is related to a poor  inspiratory effort. The patient is intubated. Upper lobe and perihilar  airspace disease could represent some early developing pulmonary edema.  No effusions are present. There is moderate cardiomegaly.  2. The thoracic aorta is normal in caliber with no definite dissection  or aneurysm. The proximal great vessels are unremarkable..        This report was finalized on 03/09/2018 22:28 by Dr. Pravin Jean-Baptiste MD.    CT Abdomen Pelvis With Contrast [[id number]] Collected:  03/09/18 2229     Updated:  03/09/18 2238    Narrative:       CT ABDOMEN PELVIS W CONTRAST- 3/9/2018 9:11 PM CST     HISTORY: cardiac arrest       COMPARISON: None.      DLP: 2204 mGy cm. Automated exposure control was utilized to diminish  patient radiation dose.     TECHNIQUE: Following the intravenous administration of contrast, helical  CT tomographic images of the abdomen and pelvis were acquired. Coronal  reformatted images were also provided for review.      FINDINGS:   Bilateral lower lobe airspace disease is present for which I would favor  atelectasis. Early pulmonary edema may also contribute to this finding.  There is moderate cardiomegaly. No evidence of pericardial effusion..      LIVER: No focal liver lesion. The hepatic vasculature is patent.      BILIARY SYSTEM: The gallbladder is unremarkable. No intrahepatic or  extrahepatic ductal dilatation.      PANCREAS: No focal pancreatic lesion.      SPLEEN: Unremarkable.      KIDNEYS AND ADRENALS: Bilateral kidneys and adrenal glands are  unremarkable. The ureters are decompressed and  normal in appearance.     RETROPERITONEUM: No mass, lymphadenopathy or hemorrhage.      GI TRACT: No evidence of obstruction or bowel wall thickening. The  appendix is visualized and unremarkable.     OTHER: There is no mesenteric mass, lymphadenopathy or fluid collection.  The abdominopelvic vasculature is patent. The osseous structures and  soft tissues demonstrate no worrisome lesions. There is a small amount  of free fluid within the pelvis. There is some stranding within the  subcutaneous tissue suggests there may be some third spacing of fluid.  The patient is facet status post hysterectomy.      PELVIS: No evidence of mass. The patient is status post hysterectomy..  The urinary bladder is normal in appearance.       Impression:       1. Patchy bilateral airspace disease within the lower lobes. This is in  part related atelectasis but some superimposed pulmonary edema is also a  differential. There is some free fluid within the pelvis as well as some  stranding within the subcutaneous tissue suggesting that there may be  some third spacing of fluid within the subcutaneous tissues suggesting  developing anasarca.  2. The soft tissue organs are unremarkable. The abdominal aorta is  normal in caliber with no evidence of dissection or aneurysm..      3. Moderate cardiomegaly. No evidence of pericardial effusion.     This report was finalized on 03/09/2018 22:35 by Dr. Pravin Jean-Baptiste MD.        I have personally reviewed and interpreted the radiology studies and ECG obtained at time of admission.     Assessment / Plan     Assessment:   Hospital Problem List     Cardiac arrest      1. Cardiac arrest code chill protocol initiated will monitor patients progress.  Suspect cause may be due to arrythmia caused by increased medication effects.     Patient seen after midnight            Code Status: full     I discussed the patients findings and my recommendations with the family    Estimated length of stay to be  "determined    Sanchez Bates MD   03/10/18   12:43 AM            Electronically signed by Sanchez Bates MD at 3/10/2018 12:59 AM          Emergency Department Notes      Leonardo Arreguin MD at 3/9/2018  9:30 PM      Procedure Orders:    1. Critical Care [[id number]] ordered by Leonardo Arreguin MD at 03/09/18 2324     2. Insert Central Line At Bedside [[id number]] ordered by Leonardo Arreguin MD at 03/09/18 2323     3. ECG 12 Lead [[id number]] ordered by Leonardo Arreguin MD at 03/09/18 2326                Subjective   HPI Comments: 48 y/o female arrives via ems for cardiac arrest. Patient arrives with GCS of 3 T unresponsive with fixed and dilated pupils. All history from EMS and family. Per friend/family patient was boweling and collapsed suddenly stating she did not feel well. Per EMS down time >25 minutes but with ROSC actually on arrival here after two rounds of epi, one shock and narcan. Per family, patient has been complaining of a cough and not feeling well. Was apparently told to \"double her sleeping medication\" but family unsure of what that is. Family does note history of brain aneurysms in the past and seizures as well but friend (who also has seizures) denies that patient had a seizure. Patient arrives as above    Patient is a 47 y.o. female presenting with cardiac arrest.   History provided by:  EMS personnel and relative  Cardiac Arrest       Review of Systems   Unable to perform ROS: Intubated       Past Medical History:   Diagnosis Date   • Hypertension    • Sleep apnea        No Known Allergies    Past Surgical History:   Procedure Laterality Date   • HYSTERECTOMY         History reviewed. No pertinent family history.    Social History     Social History   • Marital status: Single     Social History Main Topics   • Smoking status: Never Smoker   • Alcohol use Defer   • Drug use: Defer           Objective   Physical Exam   Constitutional: She appears well-developed and " well-nourished.   HENT:   Head: Normocephalic and atraumatic.   Right Ear: External ear normal.   Left Ear: External ear normal.   Mouth/Throat: Oropharynx is clear and moist.   Eyes:   Fixed dilated pupils   Neck: Normal range of motion. Neck supple.   Cardiovascular: Regular rhythm, normal heart sounds and intact distal pulses.    tachycardia   Pulmonary/Chest: Breath sounds normal.   No spontanous breathing, breathsounds bilterally with bagging.    Abdominal: Soft. Bowel sounds are normal.   ? Left sided hernia vs lipoma felt in the upper abdomen.    Musculoskeletal: She exhibits no edema or tenderness.   Neurological:   gcs 3 T    Has no corneal, no gag no reflexes, no clonus.    Skin: Skin is warm.   Vitals reviewed.      Central Line At Bedside  Date/Time: 3/9/2018 11:23 PM  Performed by: KENNA HELTON  Authorized by: KENNA HELTON     Consent:     Consent obtained:  Emergent situation  Pre-procedure details:     Hand hygiene: Hand hygiene performed prior to insertion      Sterile barrier technique: All elements of maximal sterile technique followed      Skin preparation:  2% chlorhexidine    Skin preparation agent: Skin preparation agent completely dried prior to procedure    Anesthesia (see MAR for exact dosages):     Anesthesia method:  Local infiltration    Local anesthetic:  Lidocaine 1% w/o epi  Procedure details:     Location:  R femoral    Patient position:  Flat    Procedural supplies:  Triple lumen    Catheter size:  7 Fr    Landmarks identified: yes      Ultrasound guidance: no      Number of attempts:  1    Successful placement: yes    Post-procedure details:     Post-procedure:  Dressing applied    Assessment:  Blood return through all ports and free fluid flow    Patient tolerance of procedure:  Tolerated well, no immediate complications  Critical Care  Performed by: KENNA HELTON  Authorized by: KENNA HELTON     Critical care provider statement:     Critical care  time (minutes):  30    Critical care start time:  3/9/2018 8:35 PM    Critical care end time:  3/9/2018 11:24 PM    Critical care time was exclusive of:  Separately billable procedures and treating other patients    Critical care was necessary to treat or prevent imminent or life-threatening deterioration of the following conditions:  Respiratory failure    Critical care was time spent personally by me on the following activities:  Blood draw for specimens, development of treatment plan with patient or surrogate, discussions with primary provider, evaluation of patient's response to treatment, examination of patient, obtaining history from patient or surrogate, review of old charts, re-evaluation of patient's condition, ordering and review of radiographic studies, ordering and review of laboratory studies and ordering and performing treatments and interventions    I assumed direction of critical care for this patient from another provider in my specialty: no    ECG 12 Lead    Date/Time: 3/9/2018 8:35 PM  Performed by: LEONARDO HELTON  Authorized by: INES VELSAQUEZ   Interpreted by physician  Rhythm: sinus tachycardia  Rate: tachycardic  BPM: 131  QRS axis: normal  Comments: St-t depression              ED Course  ED Course   Comment By Time   Cold chill started when ct head read as negative without edema. Leonardo Helton MD 03/09 2205      CT Abdomen Pelvis With Contrast   Final Result   1. Patchy bilateral airspace disease within the lower lobes. This is in   part related atelectasis but some superimposed pulmonary edema is also a   differential. There is some free fluid within the pelvis as well as some   stranding within the subcutaneous tissue suggesting that there may be   some third spacing of fluid within the subcutaneous tissues suggesting   developing anasarca.   2. The soft tissue organs are unremarkable. The abdominal aorta is   normal in caliber with no evidence of dissection or aneurysm..         3. Moderate cardiomegaly. No evidence of pericardial effusion.       This report was finalized on 03/09/2018 22:35 by Dr. Pravin Jean-Baptiste MD.      CT Chest With Contrast   Final Result   1. Bilateral airspace disease some of which is related to a poor   inspiratory effort. The patient is intubated. Upper lobe and perihilar   airspace disease could represent some early developing pulmonary edema.   No effusions are present. There is moderate cardiomegaly.   2. The thoracic aorta is normal in caliber with no definite dissection   or aneurysm. The proximal great vessels are unremarkable..           This report was finalized on 03/09/2018 22:28 by Dr. Pravin Jean-Baptiste MD.      CT Cervical Spine Without Contrast   Final Result   1. Arthritic changes. No evidence of acute osseous injury or   malalignment. The study is degraded by motion.   2. Groundglass disease within the upper lung apices suspicious for   interstitial pulmonary edema.           This report was finalized on 03/09/2018 21:56 by Dr. Pravin Jean-Baptiste MD.      XR Chest 1 View   Final Result   1.. Marked cardiomegaly with pulmonary vascular congestion.   2. Endotracheal tube well positioned.   This report was finalized on 03/09/2018 21:53 by Dr. Pravin Jean-Baptiste MD.      CT Head Without Contrast   Final Result   1.. Limited exam secondary to extensive motion artifact even after   repeating the sequence multiple times.   2. Grossly unremarkable nonenhanced CT of the brain.   This report was finalized on 03/09/2018 21:51 by Dr. Pravin Jean-Baptiste MD.        Labs Reviewed   COMPREHENSIVE METABOLIC PANEL - Abnormal; Notable for the following:        Result Value    Glucose 180 (*)     Potassium 3.1 (*)     AST (SGOT) 53 (*)     Anion Gap 16.0 (*)     All other components within normal limits   BNP (IN-HOUSE) - Abnormal; Notable for the following:     proBNP 4230.0 (*)     All other components within normal limits   TROPONIN (IN-HOUSE) - Abnormal;  Notable for the following:     Troponin I 0.059 (*)     All other components within normal limits   CBC WITH AUTO DIFFERENTIAL - Abnormal; Notable for the following:     WBC 20.13 (*)     MCH 27.3 (*)     MCHC 30.8 (*)     Neutrophil % 84.2 (*)     Lymphocyte % 10.2 (*)     Monocyte % 3.4 (*)     Neutrophils, Absolute 16.94 (*)     Immature Grans, Absolute 0.26 (*)     All other components within normal limits   BLOOD GAS, ARTERIAL - Abnormal; Notable for the following:     pH, Arterial 7.271 (*)     pCO2, Arterial 48.4 (*)     pO2, Arterial 118.0 (*)     Base Excess, Arterial -4.9 (*)     All other components within normal limits   LACTIC ACID, PLASMA - Abnormal; Notable for the following:     Lactate 3.4 (*)     All other components within normal limits   PHOSPHORUS - Abnormal; Notable for the following:     Phosphorus 4.7 (*)     All other components within normal limits   CALCIUM, IONIZED - Abnormal; Notable for the following:     Ionized Calcium 4.46 (*)     All other components within normal limits   PROTIME-INR - Normal   APTT - Normal   CK - Normal   ETHANOL - Normal    Narrative:     Not for legal purposes. Chain of Custody not followed.    MAGNESIUM - Normal   PROCALCITONIN - Normal    Narrative:     SIRS, sepsis, severe sepsis, and septic shock are categorized according to the criteria of the consensus conference of the American College of Chest Physicians/Society of Critical Care Medicine.    PCT < 0.5 ng/mL     Systemic infection (sepsis) is not likely.    PCT >0.5 and < 2.0 ng/mL Systemic infection (sepsis) is possible, but other conditions are known to elevate PCT as well.    PCT > 2.0 ng/mL     Systemic infection (sepsis) is likely, unless other causes are known.      PCT > 10.0 ng/mL    Important systemic inflammatory response, almost exclusively due to severe bacterial sepsis or septic shock.    PCT values of < 0.5 ng/mL do not exclude an infection, because localized infections (without systemic  signs) may be associated with such low concentrations, or a systemic infection in its initial stages (<6 hours).  Increased PCT can occur without infection.  PCT concentrations between 0.5 and 2.0 ng/mL should be interpreted taking into account the patients history.  It is recommended to retest PCT within 6-24 hours if any concentrations < 2.0 ng/mL are obtained.   TSH - Normal   T4, FREE - Normal   CK TOTAL AND CKMB - Normal    Narrative:     CKMB Index not indicated   HCG, SERUM, QUALITATIVE - Normal   URINE CULTURE   RAINBOW DRAW    Narrative:     The following orders were created for panel order [facility] Draw.  Procedure                               Abnormality         Status                     ---------                               -----------         ------                     Light Blue Top[[id number]]                                    Final result               Green Top (Gel)[[id number]]                                   Final result               Lavender Top[[id number]]                                      Final result               Red Top[[id number]]                                           Final result                 Please view results for these tests on the individual orders.   BLOOD GAS, ARTERIAL   PREGNANCY, URINE   URINALYSIS W/ CULTURE IF INDICATED   URINE DRUG SCREEN   LACTIC ACID, PLASMA   CALCIUM, IONIZED   CALCIUM, IONIZED   PHOSPHORUS   URINALYSIS, MICROSCOPIC ONLY   LACTIC ACID REFLEX TIMER   POCT GLUCOSE FINGERSTICK   POCT GLUCOSE FINGERSTICK   POCT GLUCOSE FINGERSTICK   POCT GLUCOSE FINGERSTICK   POCT GLUCOSE FINGERSTICK   POCT GLUCOSE FINGERSTICK   POCT GLUCOSE FINGERSTICK   POCT GLUCOSE FINGERSTICK   POCT GLUCOSE FINGERSTICK   POCT GLUCOSE FINGERSTICK   POCT GLUCOSE FINGERSTICK   POCT GLUCOSE FINGERSTICK   POCT GLUCOSE FINGERSTICK   POCT GLUCOSE FINGERSTICK   TYPE AND SCREEN   LIGHT BLUE TOP   GREEN TOP   LAVENDER TOP   RED TOP   CBC AND DIFFERENTIAL    Narrative:     The following orders  were created for panel order CBC & Differential.  Procedure                               Abnormality         Status                     ---------                               -----------         ------                     CBC Auto Differential[[id number]]        Abnormal            Final result                 Please view results for these tests on the individual orders.     Patient is now biting on tube but she is still without gag or corneal reflexes, she has no purposeful movements at this time. Given CT findings of head that were without edema or blood I did order the cooling protocol. Of note, patient with CHF and pulmonary edema, lasix was given, given fever here abx were given as well. I attempted to do a bedside US but given body habitus I could not see anything. I do ? Etiology of symptoms and question if this if cardiogenic in nature given chf and slightly elevated trop. I did discuss with hospitalist if he would like me to call cards given abnormal ekg and cardiac arrest vs IV nitro for HTN. States he will write orders and does not wish for stat cardiology call at this time. Family aware of all findings and very ill patient and need for cooling. They are aware that even with quick CPR there is a risk of damage to brain and that cooling is best option to perseve function at this time.              MDM    Final diagnoses:   Cardiac arrest   Acute on chronic systolic congestive heart failure   Fever in adult            Leonardo Arreguin MD  03/09/18 2084      Electronically signed by Leonardo Arreguin MD at 3/9/2018 11:34 PM       Physician Progress Notes (last 24 hours) (Notes from 3/11/2018  1:19 PM through 3/12/2018  1:19 PM)     No notes of this type exist for this encounter.

## 2018-03-12 NOTE — PROGRESS NOTES
[facility] [place] [facility] CODE Note       Date of Encounter: 3/10/2018    Attending Physician: Dr. Sanchez Bates    Description of Events: Called to see patient due to progressive hypotension required 3 pressors CPR initiated due to PEA multiple rounds of Epi Atropine and Bicarb given ultimately Epi drip initiated and patient's BP stabilized and rhythm remained sinus to sinus tach.  Vent settings turned up FI02 to 100%.  Patient finally stabilized in critical condition.  Suspect poor prognosis.    Outcome: Patient stabilized.      Sanchez Bates MD  3/11/2018  8:32 PM

## 2018-03-15 LAB
BACTERIA SPEC AEROBE CULT: NORMAL
BACTERIA SPEC AEROBE CULT: NORMAL

## 2019-01-22 ENCOUNTER — TELEPHONE (OUTPATIENT)
Dept: URGENT CARE | Age: 49
End: 2019-01-22

## 2024-09-28 NOTE — PATIENT INSTRUCTIONS
INSTRUCTIONS:  1. Increase the Trazodone to 200 mg (1 1/3) tablet at bedtime. If that does not help, will try Bellsomra. 2. Will have you get a sleep study.
15